# Patient Record
Sex: FEMALE | Race: WHITE | Employment: OTHER | URBAN - METROPOLITAN AREA
[De-identification: names, ages, dates, MRNs, and addresses within clinical notes are randomized per-mention and may not be internally consistent; named-entity substitution may affect disease eponyms.]

---

## 2024-07-23 ENCOUNTER — TELEPHONE (OUTPATIENT)
Dept: FAMILY MEDICINE CLINIC | Facility: CLINIC | Age: 74
End: 2024-07-23

## 2024-07-24 ENCOUNTER — LAB ENCOUNTER (OUTPATIENT)
Dept: LAB | Facility: HOSPITAL | Age: 74
End: 2024-07-24
Attending: FAMILY MEDICINE
Payer: MEDICARE

## 2024-07-24 ENCOUNTER — HOSPITAL ENCOUNTER (OUTPATIENT)
Dept: GENERAL RADIOLOGY | Facility: HOSPITAL | Age: 74
Discharge: HOME | End: 2024-07-24
Attending: FAMILY MEDICINE
Payer: MEDICARE

## 2024-07-24 ENCOUNTER — OFFICE VISIT (OUTPATIENT)
Dept: FAMILY MEDICINE CLINIC | Facility: CLINIC | Age: 74
End: 2024-07-24
Payer: MEDICARE

## 2024-07-24 VITALS
HEIGHT: 61 IN | WEIGHT: 150 LBS | RESPIRATION RATE: 16 BRPM | DIASTOLIC BLOOD PRESSURE: 82 MMHG | OXYGEN SATURATION: 96 % | HEART RATE: 82 BPM | TEMPERATURE: 98 F | SYSTOLIC BLOOD PRESSURE: 130 MMHG | BODY MASS INDEX: 28.32 KG/M2

## 2024-07-24 DIAGNOSIS — Z79.01 CHRONIC ANTICOAGULATION: ICD-10-CM

## 2024-07-24 DIAGNOSIS — I10 ESSENTIAL HYPERTENSION: ICD-10-CM

## 2024-07-24 DIAGNOSIS — M80.00XS AGE-RELATED OSTEOPOROSIS WITH CURRENT PATHOLOGICAL FRACTURE, SEQUELA: ICD-10-CM

## 2024-07-24 DIAGNOSIS — M48.061 SPINAL STENOSIS OF LUMBAR REGION, UNSPECIFIED WHETHER NEUROGENIC CLAUDICATION PRESENT: ICD-10-CM

## 2024-07-24 DIAGNOSIS — E78.00 HIGH CHOLESTEROL: ICD-10-CM

## 2024-07-24 DIAGNOSIS — Z86.711 HX OF PULMONARY EMBOLUS: ICD-10-CM

## 2024-07-24 DIAGNOSIS — J45.40 MODERATE PERSISTENT ASTHMA, UNSPECIFIED WHETHER COMPLICATED (HCC): ICD-10-CM

## 2024-07-24 DIAGNOSIS — Z01.812 PRE-OPERATIVE LABORATORY EXAMINATION: ICD-10-CM

## 2024-07-24 DIAGNOSIS — Z01.818 PREOP EXAMINATION: ICD-10-CM

## 2024-07-24 DIAGNOSIS — N18.31 CKD STAGE 3A, GFR 45-59 ML/MIN (HCC): ICD-10-CM

## 2024-07-24 DIAGNOSIS — Z01.818 PREOP EXAMINATION: Primary | ICD-10-CM

## 2024-07-24 DIAGNOSIS — E11.65 TYPE 2 DIABETES MELLITUS WITH HYPERGLYCEMIA, WITHOUT LONG-TERM CURRENT USE OF INSULIN (HCC): ICD-10-CM

## 2024-07-24 PROBLEM — M80.00XA AGE-RELATED OSTEOPOROSIS WITH CURRENT PATHOLOGICAL FRACTURE: Status: ACTIVE | Noted: 2024-07-24

## 2024-07-24 PROBLEM — J45.909 ASTHMA (HCC): Status: ACTIVE | Noted: 2024-07-24

## 2024-07-24 LAB
ALBUMIN SERPL-MCNC: 4.9 G/DL (ref 3.2–4.8)
ALBUMIN/GLOB SERPL: 2.2 {RATIO} (ref 1–2)
ALP LIVER SERPL-CCNC: 72 U/L
ALT SERPL-CCNC: 15 U/L
ANION GAP SERPL CALC-SCNC: 8 MMOL/L (ref 0–18)
APTT PPP: 41.3 SECONDS (ref 23–36)
AST SERPL-CCNC: 32 U/L (ref ?–34)
ATRIAL RATE: 80 BPM
BASOPHILS # BLD AUTO: 0.07 X10(3) UL (ref 0–0.2)
BASOPHILS NFR BLD AUTO: 0.8 %
BILIRUB SERPL-MCNC: 0.6 MG/DL (ref 0.2–1.1)
BILIRUB UR QL: NEGATIVE
BUN BLD-MCNC: 15 MG/DL (ref 9–23)
BUN/CREAT SERPL: 19.2 (ref 10–20)
CALCIUM BLD-MCNC: 9.8 MG/DL (ref 8.7–10.4)
CHLORIDE SERPL-SCNC: 107 MMOL/L (ref 98–112)
CO2 SERPL-SCNC: 25 MMOL/L (ref 21–32)
CREAT BLD-MCNC: 0.78 MG/DL
DEPRECATED RDW RBC AUTO: 46.9 FL (ref 35.1–46.3)
EGFRCR SERPLBLD CKD-EPI 2021: 81 ML/MIN/1.73M2 (ref 60–?)
EOSINOPHIL # BLD AUTO: 0.2 X10(3) UL (ref 0–0.7)
EOSINOPHIL NFR BLD AUTO: 2.2 %
ERYTHROCYTE [DISTWIDTH] IN BLOOD BY AUTOMATED COUNT: 13.7 % (ref 11–15)
FASTING STATUS PATIENT QL REPORTED: NO
GLOBULIN PLAS-MCNC: 2.2 G/DL (ref 2–3.5)
GLUCOSE BLD-MCNC: 99 MG/DL (ref 70–99)
GLUCOSE UR-MCNC: NORMAL MG/DL
HCT VFR BLD AUTO: 40.8 %
HGB BLD-MCNC: 13.1 G/DL
HGB UR QL STRIP.AUTO: NEGATIVE
IMM GRANULOCYTES # BLD AUTO: 0.03 X10(3) UL (ref 0–1)
IMM GRANULOCYTES NFR BLD: 0.3 %
INR BLD: 0.99 (ref 0.8–1.2)
KETONES UR-MCNC: NEGATIVE MG/DL
LEUKOCYTE ESTERASE UR QL STRIP.AUTO: 500
LYMPHOCYTES # BLD AUTO: 2.35 X10(3) UL (ref 1–4)
LYMPHOCYTES NFR BLD AUTO: 26.3 %
MCH RBC QN AUTO: 29.6 PG (ref 26–34)
MCHC RBC AUTO-ENTMCNC: 32.1 G/DL (ref 31–37)
MCV RBC AUTO: 92.1 FL
MONOCYTES # BLD AUTO: 0.53 X10(3) UL (ref 0.1–1)
MONOCYTES NFR BLD AUTO: 5.9 %
NEUTROPHILS # BLD AUTO: 5.77 X10 (3) UL (ref 1.5–7.7)
NEUTROPHILS # BLD AUTO: 5.77 X10(3) UL (ref 1.5–7.7)
NEUTROPHILS NFR BLD AUTO: 64.5 %
OSMOLALITY SERPL CALC.SUM OF ELEC: 291 MOSM/KG (ref 275–295)
P AXIS: 47 DEGREES
P-R INTERVAL: 188 MS
PH UR: 5.5 [PH] (ref 5–8)
PLATELET # BLD AUTO: 349 10(3)UL (ref 150–450)
POTASSIUM SERPL-SCNC: 4.4 MMOL/L (ref 3.5–5.1)
PROT SERPL-MCNC: 7.1 G/DL (ref 5.7–8.2)
PROT UR-MCNC: NEGATIVE MG/DL
PROTHROMBIN TIME: 13.7 SECONDS (ref 11.6–14.8)
Q-T INTERVAL: 376 MS
QRS DURATION: 100 MS
QTC CALCULATION (BEZET): 433 MS
R AXIS: -54 DEGREES
RBC # BLD AUTO: 4.43 X10(6)UL
RBC #/AREA URNS AUTO: >10 /HPF
SODIUM SERPL-SCNC: 140 MMOL/L (ref 136–145)
SP GR UR STRIP: 1.01 (ref 1–1.03)
T AXIS: 71 DEGREES
UROBILINOGEN UR STRIP-ACNC: NORMAL
VENTRICULAR RATE: 80 BPM
WBC # BLD AUTO: 9 X10(3) UL (ref 4–11)
WBC #/AREA URNS AUTO: >50 /HPF

## 2024-07-24 PROCEDURE — 87077 CULTURE AEROBIC IDENTIFY: CPT

## 2024-07-24 PROCEDURE — 87086 URINE CULTURE/COLONY COUNT: CPT

## 2024-07-24 PROCEDURE — 87186 SC STD MICRODIL/AGAR DIL: CPT

## 2024-07-24 PROCEDURE — 85730 THROMBOPLASTIN TIME PARTIAL: CPT

## 2024-07-24 PROCEDURE — 87147 CULTURE TYPE IMMUNOLOGIC: CPT

## 2024-07-24 PROCEDURE — 85025 COMPLETE CBC W/AUTO DIFF WBC: CPT

## 2024-07-24 PROCEDURE — 80053 COMPREHEN METABOLIC PANEL: CPT

## 2024-07-24 PROCEDURE — 99204 OFFICE O/P NEW MOD 45 MIN: CPT | Performed by: FAMILY MEDICINE

## 2024-07-24 PROCEDURE — 93005 ELECTROCARDIOGRAM TRACING: CPT

## 2024-07-24 PROCEDURE — 87081 CULTURE SCREEN ONLY: CPT

## 2024-07-24 PROCEDURE — 93010 ELECTROCARDIOGRAM REPORT: CPT | Performed by: INTERNAL MEDICINE

## 2024-07-24 PROCEDURE — 36415 COLL VENOUS BLD VENIPUNCTURE: CPT

## 2024-07-24 PROCEDURE — 81001 URINALYSIS AUTO W/SCOPE: CPT

## 2024-07-24 PROCEDURE — 85610 PROTHROMBIN TIME: CPT

## 2024-07-24 PROCEDURE — 71046 X-RAY EXAM CHEST 2 VIEWS: CPT | Performed by: FAMILY MEDICINE

## 2024-07-24 RX ORDER — LEVOTHYROXINE SODIUM 0.05 MG/1
50 TABLET ORAL
COMMUNITY

## 2024-07-24 RX ORDER — OXYCODONE HYDROCHLORIDE AND ACETAMINOPHEN 5; 325 MG/1; MG/1
1 TABLET ORAL 2 TIMES DAILY
COMMUNITY

## 2024-07-24 RX ORDER — FLUTICASONE FUROATE, UMECLIDINIUM BROMIDE AND VILANTEROL TRIFENATATE 200; 62.5; 25 UG/1; UG/1; UG/1
1 POWDER RESPIRATORY (INHALATION) DAILY
COMMUNITY

## 2024-07-24 RX ORDER — LISINOPRIL 5 MG/1
5 TABLET ORAL DAILY
COMMUNITY

## 2024-07-24 RX ORDER — DULOXETIN HYDROCHLORIDE 30 MG/1
30 CAPSULE, DELAYED RELEASE ORAL DAILY
COMMUNITY

## 2024-07-24 RX ORDER — SIMVASTATIN 10 MG
10 TABLET ORAL DAILY
COMMUNITY

## 2024-07-24 RX ORDER — CHOLECALCIFEROL (VITAMIN D3) 50 MCG
2000 TABLET ORAL DAILY
COMMUNITY

## 2024-07-24 RX ORDER — GABAPENTIN 400 MG/1
400 CAPSULE ORAL 3 TIMES DAILY
COMMUNITY

## 2024-07-24 RX ORDER — FERROUS SULFATE 325(65) MG
325 TABLET, DELAYED RELEASE (ENTERIC COATED) ORAL
COMMUNITY

## 2024-07-24 RX ORDER — MORPHINE SULFATE 15 MG/1
15 TABLET ORAL 2 TIMES DAILY
COMMUNITY

## 2024-07-24 RX ORDER — MONTELUKAST SODIUM 10 MG/1
10 TABLET ORAL 2 TIMES DAILY
COMMUNITY

## 2024-07-24 RX ORDER — FENOFIBRATE 134 MG/1
134 CAPSULE ORAL DAILY
COMMUNITY

## 2024-07-24 NOTE — TELEPHONE ENCOUNTER
L3-5 Laminectomy on 08/22/24 with Dr. Krishna @ [facility]    H&P- completed 07/24/24  Labs- RDW-SD (46.9), albumin (4.9), A/G ratio (2.2), PTT (41.3), +MRSA, Urine Cx shows >100,000 CFU/ML Klebsiella Pneumoniae, all other labs WNL    EKG- Normal sinus rhythm   Left anterior fascicular block   Minimal voltage criteria for LVH, may be normal variant ( David product )   Nonspecific T wave abnormality   Abnormal ECG   When compared with ECG of 29-DEC-2004 22:19,   Left anterior fascicular block is now Present   Non-specific change in ST segment in Inferior leads   T wave inversion no longer evident in Inferior leads   T wave inversion less evident in Anterior-lateral leads     X-ray- WNL    Cardiology- Dr. Ugo Trujillo @ [facility] [address] [facility]  P- [phone]  IVC filter to be placed 07/30/24 [facility] @ 8:30am  Cardio Clx sent to scanning 07/25/24:  \"Pre-Operative Cardiac Risk Level: Low Cardiac Risk. Cleared to hold Eliquis x3 days. ECG unchanged from previous tolerated cystoscopy during sepsis without cardiac event.\"    Hematology- Dr. Estrada @ [facility]  P- [phone]  F-  Eliquis recs needed  LMTCB to get clearance and recs- Dr. Krishna office may have sent already. 07/25/24

## 2024-07-25 RX ORDER — MUPIROCIN 20 MG/G
OINTMENT TOPICAL
Qty: 22 G | Refills: 0 | Status: SHIPPED | OUTPATIENT
Start: 2024-07-25 | End: 2024-08-23

## 2024-07-25 NOTE — TELEPHONE ENCOUNTER
Rx for Bactroban ointment sent to pharmacy for +MRSA. Patient will use now for 5 days BID prior to IVC filter placement on 07/30/24 and then again 5 days prior to surgery on 08/17-08/21. Patient given instructions.

## 2024-07-25 NOTE — H&P
HPI:                                                                                                                                           PRE-OP NOTE  HISTORY AND PHYSICAL                                                                                                                                                                                                                                         I have been consulted by Dr. Krishna to see Petty Valdovinos 73 year old female for a preoperative evaluation and medical clearance. Petty has a long history of worsening severe degenerative lumbar spinal stenosis. Patient is to have L3-5 laminectomy by Dr. Krishna on 8/22/24 at [facility].     Pt suffers significant pain and loss of function.     No cardiopulmonary symptoms.     No history of ENMA or DVT. Denies tobacco use.      Pt suffered from a lumbar fracture that was complicated by severe pulmonary embolism (saddle) in December 2023. She has been on chronic eliquis since that time. She is to have an IVC filter placed before surgery. Her hematologist is to recommend how to regulate her anticoagulation in the perioperative time period.       Family History   Problem Relation Age of Onset    Other (Other) Father         MI    Cancer Mother         breast      Current Outpatient Medications   Medication Sig Dispense Refill    Multiple Vitamins-Minerals (ICAPS AREDS 2 OR) Take 2 tablets by mouth daily.      Calcium Carbonate-Vit D-Min (CALCIUM 1200 OR) Take 1 tablet by mouth daily.      DULoxetine 30 MG Oral Cap DR Particles Take 1 capsule (30 mg total) by mouth daily.      apixaban 5 MG Oral Tab Take 1 tablet (5 mg total) by mouth 2 (two) times daily.      fenofibrate micronized 134 MG Oral Cap Take 1 capsule (134 mg total) by mouth daily.      gabapentin 400 MG Oral Cap Take 1 capsule (400 mg total) by mouth 3 (three) times daily.      ferrous sulfate 325 (65 FE) MG Oral Tab EC Take 1 tablet (325 mg total) by mouth 3  (three) times a week. Monday, Wednesday, Friday      levothyroxine 50 MCG Oral Tab Take 1 tablet (50 mcg total) by mouth before breakfast.      lisinopril 5 MG Oral Tab Take 1 tablet (5 mg total) by mouth daily.      metFORMIN 500 MG Oral Tab Take 1 tablet (500 mg total) by mouth 2 (two) times daily with meals.      montelukast 10 MG Oral Tab Take 1 tablet (10 mg total) by mouth 2 (two) times daily.      morphINE 15 MG Oral Tab Take 1 tablet (15 mg total) by mouth in the morning and 1 tablet (15 mg total) before bedtime.      Multiple Vitamin (MULTIVITAMIN ADULT OR) Take 1 tablet by mouth daily.      oxyCODONE-acetaminophen 5-325 MG Oral Tab Take 1 tablet by mouth in the morning and 1 tablet before bedtime.      simvastatin 10 MG Oral Tab Take 1 tablet (10 mg total) by mouth daily.      fluticasone-umeclidin-vilant (TRELEGY ELLIPTA) 200-62.5-25 MCG/ACT Inhalation Aerosol Powder, Breath Activated Inhale 1 puff into the lungs daily.      cholecalciferol 50 MCG (2000 UT) Oral Tab Take 1 tablet (2,000 Units total) by mouth daily.       Past Medical History:    Anemia    Anxiety and depression    Asthma (HCC)    Cataract    Colostomy present (HCC)    Diabetes (HCC)    Type 2    History of blood transfusion    after colectomy    HTN (hypertension)    Hyperlipidemia    Hypothyroidism    Kidney stones    Macular degeneration    Osteoporosis    Pneumonia    Pulmonary embolism (HCC)    saddle- possibly due to immobility/fall    Pyelonephritis    Spinal stenosis    lumbar    Stage 3a chronic kidney disease (CKD) (HCC)     Past Surgical History:   Procedure Laterality Date    Colon surgery      costomy bag right side of abdomen    Lumbar spine surgery      kyphoplasty    Other surgical history      ureteric stent- due to kidney stones    Other surgical history  12/2023    saddle pulmonary embolism removed    Parathyroidectomy       Social History     Socioeconomic History    Marital status:      Spouse name: Not on file     Number of children: Not on file    Years of education: Not on file    Highest education level: Not on file   Occupational History    Not on file   Tobacco Use    Smoking status: Never     Passive exposure: Never    Smokeless tobacco: Never   Vaping Use    Vaping status: Never Used   Substance and Sexual Activity    Alcohol use: Never    Drug use: Never    Sexual activity: Not on file   Other Topics Concern    Not on file   Social History Narrative    Not on file     Social Determinants of Health     Financial Resource Strain: Not on file   Food Insecurity: Not on file   Transportation Needs: Not on file   Physical Activity: Not on file   Stress: Not on file   Social Connections: Not on file   Housing Stability: Not on file       REVIEW OF SYSTEMS:   CONSTITUTIONAL:  Denies unusual weight gain/loss, fever, chills  EENT:  Eyes:  Denies eye pain, visual loss, blurred vision, double vision. Ears, Nose, Throat:  Denies congestion, runny nose or sore throat.  INTEGUMENTARY:  Denies rashes, itching, skin lesion,   CARDIOVASCULAR:  DVT and PE in 12/2023. Denies chest pain, palpitations, edema, dyspnea  RESPIRATORY: no ENMA ,Denies shortness of breath, wheezing, cough  GASTROINTESTINAL:  Denies abdominal pain, nausea, vomiting, constipation, diarrhea, or blood in stool.  MUSCULOSKELETAL:  severe pain as noted above  NEUROLOGICAL:  Denies headache, seizures, dizziness, syncope, paralysis, ataxia,  HEMATOLOGIC:  Denies anemia, bleeding or bruising.  LYMPHATICS:  Denies enlarged nodes   PSYCHIATRIC:  Denies depression or anxiety.  ENDOCRINOLOGIC: DM 2 yes  ALLERGIES:  Denies allergic response, history of asthma, hives,     EXAM:   /82 (BP Location: Left arm)   Pulse 82   Temp 97.8 °F (36.6 °C) (Temporal)   Resp 16   Ht 5' 1\" (1.549 m)   Wt 150 lb (68 kg)   SpO2 96%   BMI 28.34 kg/m²  Estimated body mass index is 28.34 kg/m² as calculated from the following:    Height as of this encounter: 5' 1\" (1.549 m).     Weight as of this encounter: 150 lb (68 kg).   Vital signs reviewed.Appears stated age, well groomed.  Physical Exam:  GEN:  Patient is alert, awake and oriented, well developed, well nourished, no apparent distress.  HEENT:  Head:  Normocephalic, atraumatic Eyes: EOMI, no scleral icterus, conjunctivae clear bilaterally, no eye discharge Nose: patent, no nasal discharge   NECK: Supple, no CLAD, no carotid bruit, no thyromegaly.  SKIN: No rashes, no skin lesion, no bruising, good turgor.  HEART:  Regular rate and rhythm, no murmurs, rubs or gallops.  LUNGS: Clear to auscultation bilterally, no rales/rhonchi/wheezing.  CHEST: No tenderness.  ABDOMEN:  Soft, nondistended, nontender,  no masses, no hepatosplenomegaly.  BACK: No tenderness, no spasm,   EXTREMITIES:  No edema, no cyanosis, no clubbing,   NEURO:  No focal deficit, speech fluent, normal gait, strength and tone, sensory intact      Lab Results   Component Value Date    WBC 9.0 07/24/2024    HGB 13.1 07/24/2024    HCT 40.8 07/24/2024    .0 07/24/2024    CREATSERUM 0.78 07/24/2024    BUN 15 07/24/2024     07/24/2024    K 4.4 07/24/2024     07/24/2024    CO2 25.0 07/24/2024    GLU 99 07/24/2024    CA 9.8 07/24/2024    ALB 4.9 (H) 07/24/2024    ALKPHO 72 07/24/2024    BILT 0.6 07/24/2024    TP 7.1 07/24/2024    AST 32 07/24/2024    ALT 15 07/24/2024    PTT 41.3 (H) 07/24/2024    INR 0.99 07/24/2024       XR CHEST PA + LAT CHEST (CPT=71046)    Result Date: 7/24/2024  CONCLUSION:   No focal opacity, pleural effusion, or pneumothorax.   Borderline enlarged cardiomediastinal silhouette.    Dictated by (CST): Gómez Ng MD on 7/24/2024 at 2:13 PM     Finalized by (CST): Gómez Ng MD on 7/24/2024 at 2:14 PM           EKG 12 Lead    Result Date: 7/24/2024  Normal sinus rhythm Left anterior fascicular block Minimal voltage criteria for LVH, may be normal variant ( [place] product ) Nonspecific T wave abnormality Abnormal ECG When compared  with ECG of 29-DEC-2004 22:19, Left anterior fascicular block is now Present Non-specific change in ST segment in Inferior leads T wave inversion no longer evident in Inferior leads T wave inversion less evident in Anterior-lateral leads Confirmed by Alo Aguilar ([id number]) on 7/24/2024 1:58:49 PM     ASSESSMENT AND PLAN:   Petty Valdovinos is a 73 year old female, with a hx of severe degenerative lumbar spinal stenosis. Patient is to have L3-5 laminectomy by Dr. Krishna on 8/22/24 at [facility].      Patient Active Problem List   Diagnosis    Hx of pulmonary embolus    Lumbar spinal stenosis    CKD stage 3a, GFR 45-59 ml/min ([facility])    Age-related osteoporosis with current pathological fracture    Type 2 diabetes mellitus with hyperglycemia (HCC)    Essential hypertension    High cholesterol    Asthma ([facility])    Chronic anticoagulation        ECG and labs have been reviewed and are in acceptable range for surgery.     Preoperative Risk Stratification: There are no decompensated medical conditions. ASA classification 2 - 3      Patient has and low to moderate risk for major cardiac event in the perioperative period.      PLAN:    Patient has an increased but acceptable risk of surgery and may proceed with surgery as planned. She is to have an IVC filter placed under direction of her hematologist. She is to stop eliquis 36 - 48 hours before surgery and resume postoperatively when ok with Dr. Krishna.       Postoperative Recommendations:    Anticoagulation / DVT prophylaxis: SCDs, as per Dr. Krishna. Early ambulation. Pt to resume eliquis postoperatively when cleared ok'd by Dr. Krishna  GI protection: Protonix  Incentive Spirometry   Telemetry as needed      Pain management and Physical therapy as per Orthopedic service.   Home Health as needed    Thank you for the opportunity to care for your patient and to assist in managing the postoperative course.        Daniele Panda MD  7/24/2024  8:06 PM

## 2024-07-26 RX ORDER — CEFADROXIL 500 MG/1
500 CAPSULE ORAL 2 TIMES DAILY
Qty: 20 CAPSULE | Refills: 0 | Status: SHIPPED | OUTPATIENT
Start: 2024-07-26 | End: 2024-08-09 | Stop reason: ALTCHOICE

## 2024-07-26 NOTE — TELEPHONE ENCOUNTER
Daniele Panda MD Phyllis needs mupirocin for her MRSA nasal results.  If the urine culture is sensitive to cephalosporins I would start her on cephadroxil 500 mg bid for 10 days.

## 2024-07-26 NOTE — TELEPHONE ENCOUNTER
Dr. Panda, please review:    Labs- RDW-SD (46.9), albumin (4.9), A/G ratio (2.2), PTT (41.3), +MRSA, Urine Cx shows >100,000 CFU/ML Klebsiella Pneumoniae, all other labs WNL    EKG- Normal sinus rhythm   Left anterior fascicular block   Minimal voltage criteria for LVH, may be normal variant ( David product )   Nonspecific T wave abnormality   Abnormal ECG   When compared with ECG of 29-DEC-2004 22:19,   Left anterior fascicular block is now Present   Non-specific change in ST segment in Inferior leads   T wave inversion no longer evident in Inferior leads   T wave inversion less evident in Anterior-lateral leads     X-ray- WNL    Cardiology- Dr. Ugo Trujillo @ [facility]  P- [phone]  IVC filter to be placed 07/30/24 [facility] @ 8:30am  Cardio Clx sent to scanning 07/25/24:  \"Pre-Operative Cardiac Risk Level: Low Cardiac Risk. Cleared to hold Eliquis x3 days. ECG unchanged from previous tolerated cystoscopy during sepsis without cardiac event.\"    Urine Culture:    Susceptibility     Klebsiella pneumoniae     Not Specified    Ampicillin >=32 Resistant    Ampicillin + Sulbactam 4 Sensitive    Cefazolin <=4 Sensitive    Ciprofloxacin <=0.25 Sensitive    Gentamicin <=1 Sensitive    Levofloxacin <=0.12 Sensitive    Meropenem <=0.25 Sensitive    Nitrofurantoin 64 Intermediate    Piperacillin + Tazobactam <=4 Sensitive    Trimethoprim/Sulfa <=20 Sensitive              OK to give cephalexin 500mg BID x10 days still.     OK for surgery?

## 2024-07-26 NOTE — TELEPHONE ENCOUNTER
Spoke to patient, went over antibiotic for UTI. Rx sent to pharmacy. Patient will hold Eliquis for 3 days prior to surgery. She is clear for surgery per Dr. Panda.

## 2024-07-26 NOTE — TELEPHONE ENCOUNTER
Cephadroxil 500 mg bid for 10 days for uti.     Cardiology note appreciated. Pt is medically clear to proceed with surgery as planned.

## 2024-08-22 ENCOUNTER — ANESTHESIA (OUTPATIENT)
Dept: SURGERY | Facility: HOSPITAL | Age: 74
End: 2024-08-22
Payer: MEDICARE

## 2024-08-22 ENCOUNTER — HOSPITAL ENCOUNTER (INPATIENT)
Facility: HOSPITAL | Age: 74
LOS: 1 days | Discharge: HOME | End: 2024-08-23
Attending: ORTHOPAEDIC SURGERY | Admitting: ORTHOPAEDIC SURGERY
Payer: MEDICARE

## 2024-08-22 ENCOUNTER — ANESTHESIA EVENT (OUTPATIENT)
Dept: SURGERY | Facility: HOSPITAL | Age: 74
End: 2024-08-22
Payer: MEDICARE

## 2024-08-22 ENCOUNTER — APPOINTMENT (OUTPATIENT)
Dept: GENERAL RADIOLOGY | Facility: HOSPITAL | Age: 74
End: 2024-08-22
Attending: ORTHOPAEDIC SURGERY
Payer: MEDICARE

## 2024-08-22 PROBLEM — Z01.818 PRE-OP TESTING: Status: ACTIVE | Noted: 2024-08-22

## 2024-08-22 PROBLEM — Z01.818 PRE-OP TESTING: Status: RESOLVED | Noted: 2024-08-22 | Resolved: 2024-08-22

## 2024-08-22 PROBLEM — Z98.1 S/P LUMBAR SPINAL FUSION: Status: ACTIVE | Noted: 2024-08-22

## 2024-08-22 LAB
GLUCOSE BLDC GLUCOMTR-MCNC: 102 MG/DL (ref 70–99)
GLUCOSE BLDC GLUCOMTR-MCNC: 111 MG/DL (ref 70–99)
GLUCOSE BLDC GLUCOMTR-MCNC: 126 MG/DL (ref 70–99)
GLUCOSE BLDC GLUCOMTR-MCNC: 155 MG/DL (ref 70–99)

## 2024-08-22 PROCEDURE — 94640 AIRWAY INHALATION TREATMENT: CPT

## 2024-08-22 PROCEDURE — 82962 GLUCOSE BLOOD TEST: CPT

## 2024-08-22 PROCEDURE — 00NY0ZZ RELEASE LUMBAR SPINAL CORD, OPEN APPROACH: ICD-10-PCS | Performed by: ORTHOPAEDIC SURGERY

## 2024-08-22 PROCEDURE — 0SG1071 FUSION OF 2 OR MORE LUMBAR VERTEBRAL JOINTS WITH AUTOLOGOUS TISSUE SUBSTITUTE, POSTERIOR APPROACH, POSTERIOR COLUMN, OPEN APPROACH: ICD-10-PCS | Performed by: ORTHOPAEDIC SURGERY

## 2024-08-22 PROCEDURE — 76000 FLUOROSCOPY <1 HR PHYS/QHP: CPT | Performed by: ORTHOPAEDIC SURGERY

## 2024-08-22 RX ORDER — ZOLPIDEM TARTRATE 5 MG/1
5 TABLET ORAL NIGHTLY PRN
Status: DISCONTINUED | OUTPATIENT
Start: 2024-08-22 | End: 2024-08-23

## 2024-08-22 RX ORDER — SODIUM CHLORIDE, SODIUM LACTATE, POTASSIUM CHLORIDE, CALCIUM CHLORIDE 600; 310; 30; 20 MG/100ML; MG/100ML; MG/100ML; MG/100ML
INJECTION, SOLUTION INTRAVENOUS CONTINUOUS
Status: DISCONTINUED | OUTPATIENT
Start: 2024-08-22 | End: 2024-08-22 | Stop reason: HOSPADM

## 2024-08-22 RX ORDER — EPHEDRINE SULFATE 50 MG/ML
INJECTION, SOLUTION INTRAVENOUS AS NEEDED
Status: DISCONTINUED | OUTPATIENT
Start: 2024-08-22 | End: 2024-08-22 | Stop reason: SURG

## 2024-08-22 RX ORDER — DIPHENHYDRAMINE HYDROCHLORIDE 50 MG/ML
25 INJECTION INTRAMUSCULAR; INTRAVENOUS EVERY 4 HOURS PRN
Status: DISCONTINUED | OUTPATIENT
Start: 2024-08-22 | End: 2024-08-23

## 2024-08-22 RX ORDER — LIDOCAINE HYDROCHLORIDE 40 MG/ML
SOLUTION TOPICAL AS NEEDED
Status: DISCONTINUED | OUTPATIENT
Start: 2024-08-22 | End: 2024-08-22 | Stop reason: SURG

## 2024-08-22 RX ORDER — SENNOSIDES 8.6 MG
17.2 TABLET ORAL NIGHTLY
Status: DISCONTINUED | OUTPATIENT
Start: 2024-08-22 | End: 2024-08-23

## 2024-08-22 RX ORDER — NICOTINE POLACRILEX 4 MG
30 LOZENGE BUCCAL
Status: DISCONTINUED | OUTPATIENT
Start: 2024-08-22 | End: 2024-08-23

## 2024-08-22 RX ORDER — FLUTICASONE PROPIONATE AND SALMETEROL 500; 50 UG/1; UG/1
1 POWDER RESPIRATORY (INHALATION) 2 TIMES DAILY
Status: DISCONTINUED | OUTPATIENT
Start: 2024-08-22 | End: 2024-08-23

## 2024-08-22 RX ORDER — METOCLOPRAMIDE HYDROCHLORIDE 5 MG/ML
10 INJECTION INTRAMUSCULAR; INTRAVENOUS EVERY 8 HOURS PRN
Status: DISCONTINUED | OUTPATIENT
Start: 2024-08-22 | End: 2024-08-23

## 2024-08-22 RX ORDER — DEXTROSE MONOHYDRATE 25 G/50ML
50 INJECTION, SOLUTION INTRAVENOUS
Status: DISCONTINUED | OUTPATIENT
Start: 2024-08-22 | End: 2024-08-22 | Stop reason: HOSPADM

## 2024-08-22 RX ORDER — FENOFIBRATE 134 MG/1
134 CAPSULE ORAL DAILY
Status: DISCONTINUED | OUTPATIENT
Start: 2024-08-22 | End: 2024-08-23

## 2024-08-22 RX ORDER — DULOXETIN HYDROCHLORIDE 30 MG/1
30 CAPSULE, DELAYED RELEASE ORAL DAILY
Status: DISCONTINUED | OUTPATIENT
Start: 2024-08-23 | End: 2024-08-23

## 2024-08-22 RX ORDER — OXYCODONE HYDROCHLORIDE 5 MG/1
10 TABLET ORAL EVERY 4 HOURS PRN
Status: DISCONTINUED | OUTPATIENT
Start: 2024-08-22 | End: 2024-08-23

## 2024-08-22 RX ORDER — SODIUM CHLORIDE, SODIUM LACTATE, POTASSIUM CHLORIDE, CALCIUM CHLORIDE 600; 310; 30; 20 MG/100ML; MG/100ML; MG/100ML; MG/100ML
INJECTION, SOLUTION INTRAVENOUS CONTINUOUS
Status: DISCONTINUED | OUTPATIENT
Start: 2024-08-22 | End: 2024-08-23

## 2024-08-22 RX ORDER — DIPHENHYDRAMINE HCL 25 MG
25 CAPSULE ORAL EVERY 4 HOURS PRN
Status: DISCONTINUED | OUTPATIENT
Start: 2024-08-22 | End: 2024-08-23

## 2024-08-22 RX ORDER — DIAZEPAM 10 MG/2ML
5 INJECTION, SOLUTION INTRAMUSCULAR; INTRAVENOUS ONCE
Status: COMPLETED | OUTPATIENT
Start: 2024-08-22 | End: 2024-08-22

## 2024-08-22 RX ORDER — MORPHINE SULFATE 4 MG/ML
2 INJECTION, SOLUTION INTRAMUSCULAR; INTRAVENOUS EVERY 10 MIN PRN
Status: DISCONTINUED | OUTPATIENT
Start: 2024-08-22 | End: 2024-08-22 | Stop reason: HOSPADM

## 2024-08-22 RX ORDER — NICOTINE POLACRILEX 4 MG
15 LOZENGE BUCCAL
Status: DISCONTINUED | OUTPATIENT
Start: 2024-08-22 | End: 2024-08-22 | Stop reason: HOSPADM

## 2024-08-22 RX ORDER — MORPHINE SULFATE 15 MG/1
15 TABLET ORAL EVERY 4 HOURS PRN
Status: DISCONTINUED | OUTPATIENT
Start: 2024-08-22 | End: 2024-08-23

## 2024-08-22 RX ORDER — DEXAMETHASONE SODIUM PHOSPHATE 4 MG/ML
VIAL (ML) INJECTION AS NEEDED
Status: DISCONTINUED | OUTPATIENT
Start: 2024-08-22 | End: 2024-08-22 | Stop reason: SURG

## 2024-08-22 RX ORDER — DOCUSATE SODIUM 100 MG/1
100 CAPSULE, LIQUID FILLED ORAL 2 TIMES DAILY
Status: DISCONTINUED | OUTPATIENT
Start: 2024-08-22 | End: 2024-08-23

## 2024-08-22 RX ORDER — POLYETHYLENE GLYCOL 3350 17 G/17G
17 POWDER, FOR SOLUTION ORAL DAILY PRN
Status: DISCONTINUED | OUTPATIENT
Start: 2024-08-22 | End: 2024-08-23

## 2024-08-22 RX ORDER — MONTELUKAST SODIUM 10 MG/1
10 TABLET ORAL DAILY
Status: DISCONTINUED | OUTPATIENT
Start: 2024-08-22 | End: 2024-08-23

## 2024-08-22 RX ORDER — MORPHINE SULFATE 15 MG/1
15 TABLET, FILM COATED, EXTENDED RELEASE ORAL EVERY 12 HOURS SCHEDULED
Status: DISCONTINUED | OUTPATIENT
Start: 2024-08-22 | End: 2024-08-23

## 2024-08-22 RX ORDER — MORPHINE SULFATE 4 MG/ML
4 INJECTION, SOLUTION INTRAMUSCULAR; INTRAVENOUS EVERY 10 MIN PRN
Status: DISCONTINUED | OUTPATIENT
Start: 2024-08-22 | End: 2024-08-22 | Stop reason: HOSPADM

## 2024-08-22 RX ORDER — ROCURONIUM BROMIDE 10 MG/ML
INJECTION, SOLUTION INTRAVENOUS AS NEEDED
Status: DISCONTINUED | OUTPATIENT
Start: 2024-08-22 | End: 2024-08-22 | Stop reason: SURG

## 2024-08-22 RX ORDER — VANCOMYCIN HYDROCHLORIDE 1 G/20ML
INJECTION, POWDER, LYOPHILIZED, FOR SOLUTION INTRAVENOUS AS NEEDED
Status: DISCONTINUED | OUTPATIENT
Start: 2024-08-22 | End: 2024-08-22 | Stop reason: HOSPADM

## 2024-08-22 RX ORDER — OXYCODONE HYDROCHLORIDE 5 MG/1
5 TABLET ORAL EVERY 4 HOURS PRN
Status: DISCONTINUED | OUTPATIENT
Start: 2024-08-22 | End: 2024-08-23

## 2024-08-22 RX ORDER — NICOTINE POLACRILEX 4 MG
30 LOZENGE BUCCAL
Status: DISCONTINUED | OUTPATIENT
Start: 2024-08-22 | End: 2024-08-22 | Stop reason: HOSPADM

## 2024-08-22 RX ORDER — NICOTINE POLACRILEX 4 MG
15 LOZENGE BUCCAL
Status: DISCONTINUED | OUTPATIENT
Start: 2024-08-22 | End: 2024-08-23

## 2024-08-22 RX ORDER — PRAVASTATIN SODIUM 20 MG
20 TABLET ORAL NIGHTLY
Status: DISCONTINUED | OUTPATIENT
Start: 2024-08-22 | End: 2024-08-23

## 2024-08-22 RX ORDER — NALOXONE HYDROCHLORIDE 0.4 MG/ML
0.08 INJECTION, SOLUTION INTRAMUSCULAR; INTRAVENOUS; SUBCUTANEOUS AS NEEDED
Status: DISCONTINUED | OUTPATIENT
Start: 2024-08-22 | End: 2024-08-22 | Stop reason: HOSPADM

## 2024-08-22 RX ORDER — MORPHINE SULFATE 10 MG/ML
6 INJECTION, SOLUTION INTRAMUSCULAR; INTRAVENOUS EVERY 10 MIN PRN
Status: DISCONTINUED | OUTPATIENT
Start: 2024-08-22 | End: 2024-08-22 | Stop reason: HOSPADM

## 2024-08-22 RX ORDER — BISACODYL 10 MG
10 SUPPOSITORY, RECTAL RECTAL
Status: DISCONTINUED | OUTPATIENT
Start: 2024-08-22 | End: 2024-08-23

## 2024-08-22 RX ORDER — ONDANSETRON 2 MG/ML
4 INJECTION INTRAMUSCULAR; INTRAVENOUS EVERY 6 HOURS PRN
Status: DISCONTINUED | OUTPATIENT
Start: 2024-08-22 | End: 2024-08-23

## 2024-08-22 RX ORDER — LIDOCAINE HYDROCHLORIDE 10 MG/ML
INJECTION, SOLUTION EPIDURAL; INFILTRATION; INTRACAUDAL; PERINEURAL AS NEEDED
Status: DISCONTINUED | OUTPATIENT
Start: 2024-08-22 | End: 2024-08-22 | Stop reason: SURG

## 2024-08-22 RX ORDER — PHENYLEPHRINE HCL 10 MG/ML
VIAL (ML) INJECTION AS NEEDED
Status: DISCONTINUED | OUTPATIENT
Start: 2024-08-22 | End: 2024-08-22 | Stop reason: SURG

## 2024-08-22 RX ORDER — DEXTROSE MONOHYDRATE 25 G/50ML
50 INJECTION, SOLUTION INTRAVENOUS
Status: DISCONTINUED | OUTPATIENT
Start: 2024-08-22 | End: 2024-08-23

## 2024-08-22 RX ORDER — ACETAMINOPHEN 500 MG
1000 TABLET ORAL ONCE
Status: COMPLETED | OUTPATIENT
Start: 2024-08-22 | End: 2024-08-22

## 2024-08-22 RX ORDER — HYDROMORPHONE HYDROCHLORIDE 1 MG/ML
0.2 INJECTION, SOLUTION INTRAMUSCULAR; INTRAVENOUS; SUBCUTANEOUS EVERY 5 MIN PRN
Status: DISCONTINUED | OUTPATIENT
Start: 2024-08-22 | End: 2024-08-22 | Stop reason: HOSPADM

## 2024-08-22 RX ORDER — HYDROMORPHONE HYDROCHLORIDE 1 MG/ML
0.6 INJECTION, SOLUTION INTRAMUSCULAR; INTRAVENOUS; SUBCUTANEOUS EVERY 5 MIN PRN
Status: DISCONTINUED | OUTPATIENT
Start: 2024-08-22 | End: 2024-08-22 | Stop reason: HOSPADM

## 2024-08-22 RX ORDER — TRANEXAMIC ACID 10 MG/ML
INJECTION, SOLUTION INTRAVENOUS AS NEEDED
Status: DISCONTINUED | OUTPATIENT
Start: 2024-08-22 | End: 2024-08-22 | Stop reason: SURG

## 2024-08-22 RX ORDER — BUPIVACAINE HYDROCHLORIDE AND EPINEPHRINE 5; 5 MG/ML; UG/ML
INJECTION, SOLUTION PERINEURAL AS NEEDED
Status: DISCONTINUED | OUTPATIENT
Start: 2024-08-22 | End: 2024-08-22 | Stop reason: HOSPADM

## 2024-08-22 RX ORDER — DIAZEPAM 2 MG
2 TABLET ORAL EVERY 6 HOURS PRN
Status: DISCONTINUED | OUTPATIENT
Start: 2024-08-22 | End: 2024-08-23

## 2024-08-22 RX ORDER — GLYCOPYRROLATE 0.2 MG/ML
INJECTION, SOLUTION INTRAMUSCULAR; INTRAVENOUS AS NEEDED
Status: DISCONTINUED | OUTPATIENT
Start: 2024-08-22 | End: 2024-08-22 | Stop reason: SURG

## 2024-08-22 RX ORDER — LEVOTHYROXINE SODIUM 50 UG/1
50 TABLET ORAL
Status: DISCONTINUED | OUTPATIENT
Start: 2024-08-23 | End: 2024-08-23

## 2024-08-22 RX ORDER — ACETAMINOPHEN 10 MG/ML
1000 INJECTION, SOLUTION INTRAVENOUS ONCE
Status: COMPLETED | OUTPATIENT
Start: 2024-08-22 | End: 2024-08-22

## 2024-08-22 RX ORDER — SODIUM PHOSPHATE, DIBASIC AND SODIUM PHOSPHATE, MONOBASIC 7; 19 G/230ML; G/230ML
1 ENEMA RECTAL ONCE AS NEEDED
Status: DISCONTINUED | OUTPATIENT
Start: 2024-08-22 | End: 2024-08-23

## 2024-08-22 RX ORDER — GABAPENTIN 400 MG/1
400 CAPSULE ORAL 3 TIMES DAILY
Status: DISCONTINUED | OUTPATIENT
Start: 2024-08-22 | End: 2024-08-23

## 2024-08-22 RX ORDER — HYDROMORPHONE HYDROCHLORIDE 1 MG/ML
0.4 INJECTION, SOLUTION INTRAMUSCULAR; INTRAVENOUS; SUBCUTANEOUS EVERY 5 MIN PRN
Status: DISCONTINUED | OUTPATIENT
Start: 2024-08-22 | End: 2024-08-22 | Stop reason: HOSPADM

## 2024-08-22 RX ORDER — DEXAMETHASONE SODIUM PHOSPHATE 4 MG/ML
8 VIAL (ML) INJECTION EVERY 8 HOURS
Status: COMPLETED | OUTPATIENT
Start: 2024-08-22 | End: 2024-08-23

## 2024-08-22 RX ORDER — LISINOPRIL 5 MG/1
5 TABLET ORAL DAILY
Status: DISCONTINUED | OUTPATIENT
Start: 2024-08-23 | End: 2024-08-23

## 2024-08-22 RX ORDER — ONDANSETRON 2 MG/ML
INJECTION INTRAMUSCULAR; INTRAVENOUS AS NEEDED
Status: DISCONTINUED | OUTPATIENT
Start: 2024-08-22 | End: 2024-08-22 | Stop reason: SURG

## 2024-08-22 RX ADMIN — GLYCOPYRROLATE 0.2 MG: 0.2 INJECTION, SOLUTION INTRAMUSCULAR; INTRAVENOUS at 13:35:00

## 2024-08-22 RX ADMIN — SODIUM CHLORIDE, SODIUM LACTATE, POTASSIUM CHLORIDE, CALCIUM CHLORIDE: 600; 310; 30; 20 INJECTION, SOLUTION INTRAVENOUS at 11:25:00

## 2024-08-22 RX ADMIN — EPHEDRINE SULFATE 10 MG: 50 INJECTION, SOLUTION INTRAVENOUS at 12:00:00

## 2024-08-22 RX ADMIN — EPHEDRINE SULFATE 10 MG: 50 INJECTION, SOLUTION INTRAVENOUS at 11:55:00

## 2024-08-22 RX ADMIN — EPHEDRINE SULFATE 5 MG: 50 INJECTION, SOLUTION INTRAVENOUS at 11:42:00

## 2024-08-22 RX ADMIN — ROCURONIUM BROMIDE 10 MG: 10 INJECTION, SOLUTION INTRAVENOUS at 12:48:00

## 2024-08-22 RX ADMIN — LIDOCAINE HYDROCHLORIDE 50 MG: 10 INJECTION, SOLUTION EPIDURAL; INFILTRATION; INTRACAUDAL; PERINEURAL at 11:31:00

## 2024-08-22 RX ADMIN — ONDANSETRON 4 MG: 2 INJECTION INTRAMUSCULAR; INTRAVENOUS at 11:33:00

## 2024-08-22 RX ADMIN — ROCURONIUM BROMIDE 40 MG: 10 INJECTION, SOLUTION INTRAVENOUS at 11:31:00

## 2024-08-22 RX ADMIN — EPHEDRINE SULFATE 10 MG: 50 INJECTION, SOLUTION INTRAVENOUS at 12:28:00

## 2024-08-22 RX ADMIN — PHENYLEPHRINE HCL 100 MCG: 10 MG/ML VIAL (ML) INJECTION at 12:31:00

## 2024-08-22 RX ADMIN — SODIUM CHLORIDE, SODIUM LACTATE, POTASSIUM CHLORIDE, CALCIUM CHLORIDE: 600; 310; 30; 20 INJECTION, SOLUTION INTRAVENOUS at 13:35:00

## 2024-08-22 RX ADMIN — LIDOCAINE HYDROCHLORIDE 4 ML: 40 SOLUTION TOPICAL at 11:33:00

## 2024-08-22 RX ADMIN — SODIUM CHLORIDE, SODIUM LACTATE, POTASSIUM CHLORIDE, CALCIUM CHLORIDE: 600; 310; 30; 20 INJECTION, SOLUTION INTRAVENOUS at 14:00:00

## 2024-08-22 RX ADMIN — DEXAMETHASONE SODIUM PHOSPHATE 4 MG: 4 MG/ML VIAL (ML) INJECTION at 11:33:00

## 2024-08-22 RX ADMIN — TRANEXAMIC ACID 1000 MG: 10 INJECTION, SOLUTION INTRAVENOUS at 12:26:00

## 2024-08-22 NOTE — ANESTHESIA PROCEDURE NOTES
Airway  Date/Time: 8/22/2024 11:33 AM  Urgency: Elective      General Information and Staff    Patient location during procedure: OR  Anesthesiologist: Bandar Johnson MD  Resident/CRNA: Danette Adames CRNA  Performed: CRNA and anesthesiologist   Performed by: Danette Adames CRNA  Authorized by: Bandar Johnson MD      Indications and Patient Condition  Indications for airway management: anesthesia  Sedation level: deep  Preoxygenated: yes  Patient position: sniffing  Mask difficulty assessment: 2 - vent by mask + OA or adjuvant +/- NMBA    Final Airway Details  Final airway type: endotracheal airway      Successful airway: ETT  Cuffed: yes   Successful intubation technique: Video laryngoscopy  Endotracheal tube insertion site: oral  Blade: Frankie  Blade size: #3  ETT size (mm): 3.0    Cormack-Lehane Classification: grade I - full view of glottis  Placement verified by: capnometry   Measured from: teeth  ETT to teeth (cm): 21  Number of attempts at approach: 1

## 2024-08-22 NOTE — H&P
History & Physical Examination    Patient Name: Petty Valdovinos  MRN: [id number]  [facility]: [id number]  YOB: 1950    Diagnosis: L4 compression fracture, L3-5 severe stenosis with left leg weakness    Present Illness: 73 yo female with pain shooting from her low back into her hamstrings, calves, and into her ankles. She denies any pain radiating into her feet. She does see a local pain clinic, where they prescribe her Percocet, gabapentin and morphine. Currently, she states she can only stand for 5 minutes before she has to sit down due to her pain. She denies any overt weakness in the legs, but rather pain. Has failed nonoperative treatment.   Medications Prior to Admission   Medication Sig Dispense Refill Last Dose    mupirocin 2 % External Ointment Apply ointment twice a day for five days prior to surgery - once in the morning and once in the evening 22 g 0 8/21/2024 at 2100    Multiple Vitamins-Minerals (ICAPS AREDS 2 OR) Take 1 tablet by mouth in the morning and 1 tablet before bedtime.   Past Week    Calcium Carbonate-Vit D-Min (CALCIUM 1200 OR) Take 1 tablet by mouth daily.   Past Week    DULoxetine 30 MG Oral Cap DR Particles Take 1 capsule (30 mg total) by mouth daily.   8/22/2024 at 0800    apixaban 5 MG Oral Tab Take 1 tablet (5 mg total) by mouth 2 (two) times daily.   8/18/2024    fenofibrate micronized 134 MG Oral Cap Take 1 capsule (134 mg total) by mouth daily.   8/21/2024 at 080    gabapentin 400 MG Oral Cap Take 1 capsule (400 mg total) by mouth 3 (three) times daily.   8/21/2024 at 1700    ferrous sulfate 325 (65 FE) MG Oral Tab EC Take 1 tablet (325 mg total) by mouth 3 (three) times a week. Monday, Wednesday, Friday   Past Week    levothyroxine 50 MCG Oral Tab Take 1 tablet (50 mcg total) by mouth before breakfast.   8/21/2024 at 0800    lisinopril 5 MG Oral Tab Take 1 tablet (5 mg total) by mouth daily.   8/21/2024 at 0800    metFORMIN 500 MG Oral Tab Take 1 tablet (500 mg total) by  mouth 2 (two) times daily with meals.   8/21/2024 at 2100    montelukast 10 MG Oral Tab Take 1 tablet (10 mg total) by mouth daily.   8/21/2024 at 0800    morphINE 15 MG Oral Tab Take 1 tablet (15 mg total) by mouth in the morning and 1 tablet (15 mg total) before bedtime.   8/22/2024 at 0700    Multiple Vitamin (MULTIVITAMIN ADULT OR) Take 1 tablet by mouth daily.   Past Week    oxyCODONE-acetaminophen 5-325 MG Oral Tab Take 1 tablet by mouth in the morning and 1 tablet before bedtime.   8/21/2024 at 2100    simvastatin 10 MG Oral Tab Take 1 tablet (10 mg total) by mouth nightly.   8/21/2024 at 2100    fluticasone-umeclidin-vilant (TRELEGY ELLIPTA) 200-62.5-25 MCG/ACT Inhalation Aerosol Powder, Breath Activated Inhale 1 puff into the lungs daily.   8/21/2024 at 0800    cholecalciferol 50 MCG (2000 UT) Oral Tab Take 1 tablet (2,000 Units total) by mouth daily.   Past Week    Albuterol Sulfate 108 (90 Base) MCG/ACT Inhalation Aerosol Powder, Breath Activated Inhale 1-2 puffs into the lungs as needed (asthma).   More than a month at 0800       Current Facility-Administered Medications   Medication Dose Route Frequency    lactated ringers infusion   Intravenous Continuous    ceFAZolin (Ancef) 2g in 10mL IV syringe premix  2 g Intravenous Once    And    vancomycin (Vancocin) 1,000 mg in sodium chloride 0.9% 250 mL IVPB-ADDV  15 mg/kg Intravenous Once       Allergies: Sulfa antibiotics    Past Medical History:    Anemia    Anxiety and depression    Anxiety state    Asthma (HCC)    Back problem    Calculus of kidney    Cataract    Colitis    ulcerative colitis    Colostomy present (HCC)    ileostomy in place    Deep vein thrombosis (HCC)    Depression    Diabetes (HCC)    Type 2    Disorder of thyroid    High blood pressure    High cholesterol    History of blood transfusion    after colectomy    HTN (hypertension)    Hyperlipidemia    Hypothyroidism    Kidney stones    Macular degeneration    Osteoarthritis     Osteoporosis    Pneumonia    Pulmonary embolism (HCC)    saddle- possibly due to immobility/fall    Pyelonephritis    Spinal stenosis    lumbar    Stage 3a chronic kidney disease (CKD) (HCC)    Visual impairment    readers     Past Surgical History:   Procedure Laterality Date    Colectomy      Colon surgery      ileostomy bag right side of abdomen    Ir ivc filter placement Right 07/30/2024    Lumbar spine surgery      kyphoplasty    Other surgical history      ureteric stent- due to kidney stones    Other surgical history  12/2023    saddle pulmonary embolism removed    Parathyroidectomy       Family History   Problem Relation Age of Onset    Other (Other) Father         MI    Cancer Mother         breast     Social History     Tobacco Use    Smoking status: Never     Passive exposure: Never    Smokeless tobacco: Never   Substance Use Topics    Alcohol use: Never         SYSTEM Check if Review is Normal Check if Physical Exam is Normal If not normal, please explain:   HEENT [ X] [ ]    NECK & BACK [ X] [ ]    HEART [ X] [ ]    LUNGS [ X] [ ]    ABDOMEN [ X] [ ]    UROGENITAL [ X] [ ]    UPPER EXTREMITIES [ X] [ ]    LOWER EXTREMITIES [ ] [ ] Strength:  Right iliopsoas: 5/5, Left iliopsoas 5/5  Right quadriceps 5-/5, Left quadriceps 4+/5  Right tibialis anterior 5/5, Left tibialis anterior 5/5  Right EHL 5/5, Left EHL strength 5/5  Right gastroc strength 5/5, Left gastroc strength 5/5     Plan: L3-4 and L4-5 laminectomy with possible in situ fusion.  [ x ] I have discussed the risks and benefits and alternatives with the patient/family.  They understand and agree to proceed with plan of care.  [ x ] I have reviewed the History and Physical done within the last 30 days.  Any changes noted above.    Bret Cervantes PA-C  8/22/2024  9:56 AM

## 2024-08-22 NOTE — OPERATIVE REPORT
PATIENT  Petty Valdovinos    DATE OF SURGERY  8/22/24    SURGEON   Bay Krishna MD    ASSISTANT  ELO Dobson    PREOPERATIVE DIAGNOSIS   L3-L5 spondylosis  L3-L5 spinal stenosis  History of L4 compression fracture    POSTOPERATIVE DIAGNOSIS   Same    PROCEDURES   1. L3-L5 laminectomy under direct visualization.   2. L3-L5 lumbar fusion  3. Use of fluoroscopy.   4. Use of intra-operative microscope.     DESCRIPTION OF PROCEDURE   Bilateral thecal sac decompression was performed. Exiting nerve roots were identified.     ANESTHESIA   General endotracheal    COMPLICATIONS   None.     BLOOD LOSS   Minimal.     INDICATIONS   The patient is a 74 year-old female with neurogenic claudication and   bilateral lumbar radiculopathy. She had previous sustained a compression fracture at L4. This was treated with another physician with kyphoplasty. Due to severe stenosis, she has had persistent symptoms.  She understood the risks and benefits, including risks of failure to improve, neurologic deterioration, infection, durotomy, continued low back pain, and progression of her spondylolisthesis were explained to the patient at length. The patient also understood the risks of anesthesia which include possible stroke, MI, death.       TECHNIQUE   The patient was brought to the operating room. General   anesthesia with endotracheal intubation was performed. The patient was positioned prone on the Bhavin spinal frame. Care was taken to ensure bony prominences   were well padded. The lower back was prepped and draped in the   Usual sterile fashion. A surgical timeout was performed according to the WHO standards identifying the appropriate patient, surgical site, and surgical procedure.   Under fluoroscopy, the operative levels were identified. A midline incision was made after local anesthetic was injected into the tissue. Bovie cautery was used to subperiosteally dissect the midline muscle from the midline elements out to the  medial portion of the facets. A fluoroscopy image was taken to confirm the correct levels.   Using a mayela, the lamina and medial facets bilaterally were removed down to the ligamentum leaving the pars intact at L3-4 and L4-5. At these levels, the ligamentum was removed piecemeal and the dura was exposed. The remaining lamina was removed.   An angled hockey stick elevator was tracked underneath the thecal sac proximally and distally and ensured adequate decompression with no remaining fragments compressing the nerve root or the thecal sac. At this point, we were satisfied with the extent of the decompression, the neural elements were seen to be free.     The lamina that was removed was used as posterior bone graft at L3-4, and L4-5 with the pars, facets, and tranverse processes were decorticated.     The wound was thoroughly irrigated and hemostasis was ensured.   Sterile solution was infiltrated.   Meticulous hemostasis was achieved.   Fascia was approximated with 0 Vicryl suture. Subcutaneous tissue   and skin was approximated with suture. Sterile dressing applied.   The patient returned to recovery in stable condition.     I was present for and performed the entire procedure.

## 2024-08-22 NOTE — ANESTHESIA POSTPROCEDURE EVALUATION
Patient: Petty Valdovinos    Procedure Summary       Date: 08/22/24 Room / Location: [facility] MAIN OR  / [facility] MAIN OR    Anesthesia Start: 1125 Anesthesia Stop: 1404    Procedure: L3-4, L4-5 laminectomy, possible in situ fusion (Spine Lumbar) Diagnosis: (Compression fracture, intervertebral disc disorders, radiculopathy)    Surgeons: Bya Krishna MD Anesthesiologist: Bandar Johnson MD    Anesthesia Type: general ASA Status: 3            Anesthesia Type: general    Vitals Value Taken Time   /89 08/22/24 1404   Temp 98.2 08/22/24 1404   Pulse 104 08/22/24 1404   Resp 23 08/22/24 1404   SpO2 99 % 08/22/24 1404   Vitals shown include unfiled device data.    [facility] AN Post Evaluation:   Patient Evaluated in PACU  Patient Participation: complete - patient participated  Level of Consciousness: awake and alert  Pain Score: 0  Pain Management: adequate  Airway Patency:patent  Dental exam unchanged from preop  Yes    Cardiovascular Status: acceptable  Respiratory Status: acceptable and nasal cannula  Postoperative Hydration acceptable  Comments: Accuchpadma Adames CRNA  8/22/2024 2:04 PM

## 2024-08-22 NOTE — ANESTHESIA PREPROCEDURE EVALUATION
Anesthesia PreOp Note    HPI:     Petty Valdovinos is a 74 year old female who presents for preoperative consultation requested by: Bay Krishna MD    Date of Surgery: 8/22/2024    Procedure(s):  L3-4, L4-5 laminectomy, possible in situ fusion  Indication: Compression fracture, intervertebral disc disorders, radiculopathy    Relevant Problems   No relevant active problems       NPO:  Last Liquid Consumption Date: 08/21/24  Last Liquid Consumption Time: 2100  Last Solid Consumption Date: 08/21/24  Last Solid Consumption Time: 1900  Last Liquid Consumption Date: 08/21/24          History Review:  Patient Active Problem List    Diagnosis Date Noted    Hx of pulmonary embolus 07/24/2024    Lumbar spinal stenosis 07/24/2024    CKD stage 3a, GFR 45-59 ml/min ([facility]) 07/24/2024    Age-related osteoporosis with current pathological fracture 07/24/2024    Type 2 diabetes mellitus with hyperglycemia ([facility]) 07/24/2024    Essential hypertension 07/24/2024    High cholesterol 07/24/2024    Asthma ([facility]) 07/24/2024    Chronic anticoagulation 07/24/2024       Past Medical History:    Anemia    Anxiety and depression    Anxiety state    Asthma ([facility])    Back problem    Calculus of kidney    Cataract    Colitis    ulcerative colitis    Colostomy present ([facility])    ileostomy in place    Deep vein thrombosis (HCC)    Depression    Diabetes ([facility])    Type 2    Disorder of thyroid    High blood pressure    High cholesterol    History of blood transfusion    after colectomy    HTN (hypertension)    Hyperlipidemia    Hypothyroidism    Kidney stones    Macular degeneration    Osteoarthritis    Osteoporosis    Pneumonia    Pulmonary embolism ([facility])    saddle- possibly due to immobility/fall    Pyelonephritis    Spinal stenosis    lumbar    Stage 3a chronic kidney disease (CKD) ([facility])    Visual impairment    readers       Past Surgical History:   Procedure Laterality Date    Colectomy      Colon surgery      ileostomy bag right side of abdomen     Ir ivc filter placement Right 07/30/2024    Lumbar spine surgery      kyphoplasty    Other surgical history      ureteric stent- due to kidney stones    Other surgical history  12/2023    saddle pulmonary embolism removed    Parathyroidectomy         Medications Prior to Admission   Medication Sig Dispense Refill Last Dose    mupirocin 2 % External Ointment Apply ointment twice a day for five days prior to surgery - once in the morning and once in the evening 22 g 0 8/21/2024 at 2100    Multiple Vitamins-Minerals (ICAPS AREDS 2 OR) Take 1 tablet by mouth in the morning and 1 tablet before bedtime.   Past Week    Calcium Carbonate-Vit D-Min (CALCIUM 1200 OR) Take 1 tablet by mouth daily.   Past Week    DULoxetine 30 MG Oral Cap DR Particles Take 1 capsule (30 mg total) by mouth daily.   8/22/2024 at 0800    apixaban 5 MG Oral Tab Take 1 tablet (5 mg total) by mouth 2 (two) times daily.   8/18/2024    fenofibrate micronized 134 MG Oral Cap Take 1 capsule (134 mg total) by mouth daily.   8/21/2024 at 080    gabapentin 400 MG Oral Cap Take 1 capsule (400 mg total) by mouth 3 (three) times daily.   8/21/2024 at 1700    ferrous sulfate 325 (65 FE) MG Oral Tab EC Take 1 tablet (325 mg total) by mouth 3 (three) times a week. Monday, Wednesday, Friday   Past Week    levothyroxine 50 MCG Oral Tab Take 1 tablet (50 mcg total) by mouth before breakfast.   8/21/2024 at 0800    lisinopril 5 MG Oral Tab Take 1 tablet (5 mg total) by mouth daily.   8/21/2024 at 0800    metFORMIN 500 MG Oral Tab Take 1 tablet (500 mg total) by mouth 2 (two) times daily with meals.   8/21/2024 at 2100    montelukast 10 MG Oral Tab Take 1 tablet (10 mg total) by mouth daily.   8/21/2024 at 0800    morphINE 15 MG Oral Tab Take 1 tablet (15 mg total) by mouth in the morning and 1 tablet (15 mg total) before bedtime.   8/22/2024 at 0700    Multiple Vitamin (MULTIVITAMIN ADULT OR) Take 1 tablet by mouth daily.   Past Week    oxyCODONE-acetaminophen 5-325  MG Oral Tab Take 1 tablet by mouth in the morning and 1 tablet before bedtime.   8/21/2024 at 2100    simvastatin 10 MG Oral Tab Take 1 tablet (10 mg total) by mouth nightly.   8/21/2024 at 2100    fluticasone-umeclidin-vilant (TRELEGY ELLIPTA) 200-62.5-25 MCG/ACT Inhalation Aerosol Powder, Breath Activated Inhale 1 puff into the lungs daily.   8/21/2024 at 0800    cholecalciferol 50 MCG (2000 UT) Oral Tab Take 1 tablet (2,000 Units total) by mouth daily.   Past Week    Albuterol Sulfate 108 (90 Base) MCG/ACT Inhalation Aerosol Powder, Breath Activated Inhale 1-2 puffs into the lungs as needed (asthma).   More than a month at 0800     Current Facility-Administered Medications Ordered in Epic   Medication Dose Route Frequency Provider Last Rate Last Admin    lactated ringers infusion   Intravenous Continuous Bay Krishna MD        acetaminophen (Tylenol Extra Strength) tab 1,000 mg  1,000 mg Oral Once Bay Krishna MD        ceFAZolin (Ancef) 2g in 10mL IV syringe premix  2 g Intravenous Once Bay Krishna MD        And    vancomycin (Vancocin) 1,000 mg in sodium chloride 0.9% 250 mL IVPB-ADDV  15 mg/kg Intravenous Once Bay Krishna MD         No current [facility]-ordered outpatient medications on file.       Allergies   Allergen Reactions    Sulfa Antibiotics HIVES       Family History   Problem Relation Age of Onset    Other (Other) Father         MI    Cancer Mother         breast     Social History     Socioeconomic History    Marital status:    Tobacco Use    Smoking status: Never     Passive exposure: Never    Smokeless tobacco: Never   Vaping Use    Vaping status: Never Used   Substance and Sexual Activity    Alcohol use: Never    Drug use: Never       Available pre-op labs reviewed.  Lab Results   Component Value Date    WBC 9.0 07/24/2024    RBC 4.43 07/24/2024    HGB 13.1 07/24/2024    HCT 40.8 07/24/2024    MCV 92.1 07/24/2024    MCH 29.6 07/24/2024    MCHC 32.1 07/24/2024    RDW 13.7  07/24/2024    .0 07/24/2024     Lab Results   Component Value Date     07/24/2024    K 4.4 07/24/2024     07/24/2024    CO2 25.0 07/24/2024    BUN 15 07/24/2024    CREATSERUM 0.78 07/24/2024    GLU 99 07/24/2024    CA 9.8 07/24/2024          Vital Signs:  Body mass index is 27.59 kg/m².   height is 1.549 m (5' 1\") and weight is 66.2 kg (146 lb). Her oral temperature is 97.7 °F (36.5 °C). Her blood pressure is 144/80 and her pulse is 72. Her respiration is 16 and oxygen saturation is 97%.   Vitals:    08/09/24 1144 08/22/24 0921   BP:  144/80   Pulse:  72   Resp:  16   Temp:  97.7 °F (36.5 °C)   TempSrc:  Oral   SpO2:  97%   Weight: 67.1 kg (148 lb) 66.2 kg (146 lb)   Height: 1.549 m (5' 1\") 1.549 m (5' 1\")        Anesthesia Evaluation     Patient summary reviewed and Nursing notes reviewed    No history of anesthetic complications   Airway   Mallampati: III  TM distance: >3 FB  Neck ROM: full  Dental - Dentition appears grossly intact     Pulmonary - normal exam   (+) asthma  Cardiovascular - normal exam  (+) hypertension    Neuro/Psych    (+)  anxiety/panic attacks,  depression      GI/Hepatic/Renal    (+) chronic renal disease    Endo/Other    (+) diabetes mellitus, hypothyroidism  Abdominal                  Anesthesia Plan:   ASA:  3  Plan:   General  Airway:  ETT  Post-op Pain Management: IV analgesics  Plan Comments: I have discussed the anesthetic plan, major risks and alternatives with the patient and answered all questions. The patient desires to proceed with surgery and anesthesia as planned.     HX DVT/PE - has IVC filter, last eliquis 4 days ago  Informed Consent Plan and Risks Discussed With:  Patient and child/children  Discussed plan with:  CRNA      I have informed Petty YANICK WalkerBonifacio and/or legal guardian or family member of the nature of the anesthetic plan, benefits, risks including possible dental damage if relevant, major complications, and any alternative forms of anesthetic  management.   All of the patient's questions were answered to the best of my ability. The patient desires the anesthetic management as planned.  Bandar Johnson MD  8/22/2024 9:24 AM  Present on Admission:  **None**

## 2024-08-23 VITALS
HEART RATE: 88 BPM | SYSTOLIC BLOOD PRESSURE: 111 MMHG | HEIGHT: 61 IN | WEIGHT: 146 LBS | BODY MASS INDEX: 27.56 KG/M2 | RESPIRATION RATE: 16 BRPM | OXYGEN SATURATION: 95 % | DIASTOLIC BLOOD PRESSURE: 70 MMHG | TEMPERATURE: 99 F

## 2024-08-23 LAB
ANION GAP SERPL CALC-SCNC: 7 MMOL/L (ref 0–18)
BUN BLD-MCNC: 12 MG/DL (ref 9–23)
BUN/CREAT SERPL: 16 (ref 10–20)
CALCIUM BLD-MCNC: 9.6 MG/DL (ref 8.7–10.4)
CHLORIDE SERPL-SCNC: 105 MMOL/L (ref 98–112)
CO2 SERPL-SCNC: 24 MMOL/L (ref 21–32)
CREAT BLD-MCNC: 0.75 MG/DL
DEPRECATED RDW RBC AUTO: 43.7 FL (ref 35.1–46.3)
EGFRCR SERPLBLD CKD-EPI 2021: 84 ML/MIN/1.73M2 (ref 60–?)
ERYTHROCYTE [DISTWIDTH] IN BLOOD BY AUTOMATED COUNT: 13.2 % (ref 11–15)
EST. AVERAGE GLUCOSE BLD GHB EST-MCNC: 108 MG/DL (ref 68–126)
GLUCOSE BLD-MCNC: 139 MG/DL (ref 70–99)
GLUCOSE BLDC GLUCOMTR-MCNC: 126 MG/DL (ref 70–99)
GLUCOSE BLDC GLUCOMTR-MCNC: 144 MG/DL (ref 70–99)
GLUCOSE BLDC GLUCOMTR-MCNC: 192 MG/DL (ref 70–99)
HBA1C MFR BLD: 5.4 % (ref ?–5.7)
HCT VFR BLD AUTO: 35.1 %
HGB BLD-MCNC: 11.8 G/DL
MCH RBC QN AUTO: 30.2 PG (ref 26–34)
MCHC RBC AUTO-ENTMCNC: 33.6 G/DL (ref 31–37)
MCV RBC AUTO: 89.8 FL
OSMOLALITY SERPL CALC.SUM OF ELEC: 284 MOSM/KG (ref 275–295)
PLATELET # BLD AUTO: 311 10(3)UL (ref 150–450)
POTASSIUM SERPL-SCNC: 4.6 MMOL/L (ref 3.5–5.1)
RBC # BLD AUTO: 3.91 X10(6)UL
SODIUM SERPL-SCNC: 136 MMOL/L (ref 136–145)
WBC # BLD AUTO: 14.3 X10(3) UL (ref 4–11)

## 2024-08-23 PROCEDURE — 85027 COMPLETE CBC AUTOMATED: CPT | Performed by: FAMILY MEDICINE

## 2024-08-23 PROCEDURE — 97530 THERAPEUTIC ACTIVITIES: CPT

## 2024-08-23 PROCEDURE — 97161 PT EVAL LOW COMPLEX 20 MIN: CPT

## 2024-08-23 PROCEDURE — 83036 HEMOGLOBIN GLYCOSYLATED A1C: CPT | Performed by: FAMILY MEDICINE

## 2024-08-23 PROCEDURE — 97535 SELF CARE MNGMENT TRAINING: CPT

## 2024-08-23 PROCEDURE — 80048 BASIC METABOLIC PNL TOTAL CA: CPT | Performed by: FAMILY MEDICINE

## 2024-08-23 PROCEDURE — 97165 OT EVAL LOW COMPLEX 30 MIN: CPT

## 2024-08-23 PROCEDURE — 82962 GLUCOSE BLOOD TEST: CPT

## 2024-08-23 RX ORDER — PSEUDOEPHEDRINE HCL 30 MG
100 TABLET ORAL 2 TIMES DAILY
Qty: 30 CAPSULE | Refills: 0 | Status: SHIPPED | OUTPATIENT
Start: 2024-08-23

## 2024-08-23 NOTE — PROGRESS NOTES
[facility]  part of [facility]    Progress Note    Petty Valdovinos Patient Status:  Outpatient in a Bed    1950 MRN [id number]   Location [facility] Attending Bay Krishna MD   Hosp Day # 0 PCP Blanka Grant MD     SUBJECTIVE:  Interval History: The patient is doing well overall but has continued complaints of low back and leg pain.  She was on baseline Morphine sulfate 15 mg twice daily and Percocet 10 mg twice daily.  She denies any fever, chills, gross bowel/bladder incontinence or saddle anesthesia. They also deny calf pain, cramping, chest pain, difficulty breathing or shortness of breath. They are getting up for transfers and walking without difficulty. Her hemovac output over the past 8 hour shift was 60 mL.     Post-Op Day: 1    OBJECTIVE:  Blood pressure 136/90, pulse 101, temperature 98.5 °F (36.9 °C), temperature source Temporal, resp. rate 14, height 5' 1\" (1.549 m), weight 146 lb (66.2 kg), SpO2 98%.     Ortho Spine Exam:  Incisional dressings are clean, dry and intact. Hemovac drain site intact.  Adjacent skin is without erythema or edema.  Motor exam is 5-/5 bilateral lower extremity.  2+ dorsalis pedis and posterior tibialis pulses.  Sensation is intact distally. Calves are soft and non-tender to palpation.      ASSESSMENT/PLAN:    S/P L3-5 laminectomy     1) Continue current pain management protocol for now with pain management consulted.   2) Plan to discharge home pending clearance from the medicine/hospitalist team and PT/OT, pain management and hemovac output-will recheck at 1400 today.   3) Post operative medications were sent to patient's pharmacy outside of [facility]   4) Plan to have patient remove their dressing on POD#3  5) Follow up in clinic in 2 to 3 weeks, appointment should already be scheduled, please have patient call [phone] to confirm or change date/location.   6) All questions answered by the bedside today     Tahmina Paulino,  SARAH  8/23/2024  7:09 AM

## 2024-08-23 NOTE — OCCUPATIONAL THERAPY NOTE
OCCUPATIONAL THERAPY EVALUATION - INPATIENT     Room Number: Room 1/Room 1-A  Evaluation Date: 8/23/2024  Type of Evaluation: Initial  Presenting Problem: L3-5 laminectomy and fusion    Physician Order: IP Consult to Occupational Therapy  Reason for Therapy: ADL/IADL Dysfunction and Discharge Planning    OCCUPATIONAL THERAPY ASSESSMENT   Patient is a 74 year old female admitted 8/22/2024.  Prior to admission, patient's baseline is mod I ADLs.  Patient is currently functioning below baseline with ADLs/mobility.  Patient is requiring stand-by assist and moderate assist as a result of the following impairments: decreased functional strength, decreased functional reach, decreased endurance, pain, impaired standing balance, decreased muscular endurance, and limited trunk ROM with spine prec. Occupational Therapy will continue to follow for duration of hospitalization.    Patient will benefit from continued skilled OT Services at discharge to promote prior level of function and safety with additional support and return home with home health OT    PLAN  OT Treatment Plan: Balance activities;ADL training;Functional transfer training;Patient/Family education;Patient/Family training;Equipment eval/education;Compensatory technique education  OT Device Recommendations: Other (Comment) (owns AE needed)    OCCUPATIONAL THERAPY MEDICAL/SOCIAL HISTORY   Problem List   Principal Problem:    Lumbar spinal stenosis  Active Problems:    Hx of pulmonary embolus    CKD stage 3a, GFR 45-59 ml/min ([facility])    Type 2 diabetes mellitus with hyperglycemia ([facility])    Essential hypertension    High cholesterol    Chronic anticoagulation    S/P lumbar spinal fusion    HOME SITUATION  Type of Home: House  Home Layout: One level  Lives With: Daughter; Son (son home during day / dtr works)  Toilet and Equipment: Comfort height toilet; 3-in-1 commode  Shower/Tub and Equipment: Walk-in shower; Shower chair  Other Equipment: Reacher; Other (Comment) (bed  rail, walker)  Drives: No  Patient Regularly Uses: -- (Home bound ambulator with RW --limited community for MD appt.   Indep ADL --hx of multiple falls---BSC)      Prior Level of [place]: mod I with ADLs, mostly homebound. Pt reported spending a lot of time in bed prior to surgery with severe leg and back pain. Lives with son and dtr. Reported they will be there in the daytime to assist for 2 days and then they go to work on Monday    SUBJECTIVE  \"I am getting more pain in my legs\"- with prolonged bathroom mobility/ADLs    OCCUPATIONAL THERAPY EXAMINATION   OBJECTIVE  Precautions: Spine  Fall Risk: Standard fall risk    WEIGHT BEARING RESTRICTION     PAIN ASSESSMENT  Ratin  Location: back, BLE with activity  Management Techniques: Relaxation; Repositioning; Nurse notified      COGNITION  Overall Cognitive Status:  WFL - within functional limits    RANGE OF MOTION   Upper extremity ROM is within functional limits     STRENGTH ASSESSMENT  Upper extremity strength not tested with spine prec    ACTIVITIES OF DAILY LIVING ASSESSMENT  AM-PAC ‘6-Clicks’ Inpatient Daily Activity Short Form  How much help from another person does the patient currently need…  -   Putting on and taking off regular lower body clothing?: A Lot  -   Bathing (including washing, rinsing, drying)?: A Lot  -   Toileting, which includes using toilet, bedpan or urinal? : A Little  -   Putting on and taking off regular upper body clothing?: None  -   Taking care of personal grooming such as brushing teeth?: A Little  -   Eating meals?: None    AM-PAC Score:  Score: 18  Approx Degree of Impairment: 46.65%  Standardized Score (AM-PAC Scale): 38.66  CMS Modifier (G-Code): CK    FUNCTIONAL TRANSFER ASSESSMENT  Sit to Stand: Edge of Bed  Edge of Bed: Contact Guard Assist  Toilet Transfer: Contact Guard Assist (grab bars)    BED MOBILITY  Supine to Sit : Contact Guard Assist (cues for log roll techs)  Sit to Supine (OT): Stand-by Assist (cues for log  roll techs)    BALANCE ASSESSMENT  Static Sitting: Supervision  Static Standing: Contact Guard Assist (progressing to SBA)    FUNCTIONAL ADL ASSESSMENT  Eating: Independent  Grooming Standing: Stand-by Assist  LB Dressing Seated: Moderate Assist  Toileting Seated: Modified Independent (pericare)  Toileting Standing: Contact Guard Assist (clothing management/pampers cues to avoid bending with clothing management and educated on use of reacher as needed)       Skilled Therapy Provided: RN approved session. Received patient in bed. Performed ADLs/functional mobility/transfers as stated above. Primarily limited by pain, balance, activity tolerance, LE strength.  Provided skilled cues/education on OT role, POC, functional transfer training, BADL training, compensatory strategies, AE usage, energy conservation, balance and patient with good receptiveness. At the end of session, patient left in bed per request with needs met, alarm on and RN aware of session.    EDUCATION PROVIDED  Patient: Role of Occupational Therapy; Plan of Care; Adaptive Equipment Recommendations; Functional Transfer Techniques; Fall Prevention; Surgical Precautions; Posture/Positioning; Compensatory ADL Techniques; Proper Body Mechanics  Patient's Response to Education: Verbalized Understanding; Returned Demonstration    The patient's Approx Degree of Impairment: 46.65% has been calculated based on documentation in the [facility] '6 clicks' Inpatient Daily Activity Short Form.  Research supports that patients with this level of impairment may benefit from HHOT.  Final disposition will be made by interdisciplinary medical team.    Patient End of Session: In bed;Needs met;Call light within reach;RN aware of session/findings;All patient questions and concerns addressed;Ice applied    OT Goals  Patient self-stated goal is: less pain, increased assist     Patient will complete LE dressing with mod I and AE  Comment:     Patient will complete toilet transfer  with mod I  Comment:     Patient will complete self care task at sink level with mod I   Comment:    Patient will independently recall spine precautions  Comment:         Goals  on: 2024  Frequency: 3-5x/wk    Patient Evaluation Complexity Level:   Occupational Profile/Medical History LOW - Brief history including review of medical or therapy records    Specific performance deficits impacting engagement in ADL/IADL LOW  1 - 3 performance deficits    Client Assessment/Performance Deficits LOW - No comorbidities nor modifications of tasks    Clinical Decision Making LOW - Analysis of occupational profile, problem-focused assessments, limited treatment options    Overall Complexity LOW     OT Session Time: 25 minutes  Self-Care Home Management: 15 minutes  10 min lana Gibson, OTR/L

## 2024-08-23 NOTE — PROGRESS NOTES
[facility]  part of [facility]    Progress Note    Petty Valdovinos Patient Status:  Outpatient in a Bed    1950 MRN [id number]   Location [facility] Attending Bay Krishna MD   Hosp Day # 0 PCP Blanka Grant MD       Subjective:   Petty Valdovinos is a(n) 74 year old female is doing OK except for pain control today.     Objective:   Vital Signs:  Blood pressure 124/81, pulse 87, temperature 98.2 °F (36.8 °C), temperature source Oral, resp. rate 16, height 5' 1\" (1.549 m), weight 146 lb (66.2 kg), SpO2 98%.     General: No acute distress. Alert and oriented x 3.  HEENT: Moist mucous membranes.   Neck: No lymphadenopathy.  No JVD. No carotid bruits.  Respiratory: Clear to auscultation bilaterally.  No wheezes. No rhonchi.  Cardiovascular: S1, S2.  Regular rate and rhythm.  No murmurs. Equal pulses   Abdomen: Soft, nontender, nondistended.  Positive bowel sounds. No rebound tenderness  Neurologic: No focal neurological deficits.  Musculoskeletal: Full range of motion of all extremities.  No swelling noted.  Integument: No lesions. No erythema.  Psychiatric: Appropriate mood and affect.      Assessment and Plan:     Lumbar spinal stenosis  S/P fusion POD#1      Hx of pulmonary embolus        CKD stage 3a, GFR 45-59 ml/min (HCC)        Type 2 diabetes mellitus with hyperglycemia (HCC)        Essential hypertension        High cholesterol        Chronic anticoagulation        S/P lumbar spinal fusion    Pain control  PT/OT  Discharge planning        Results:     Lab Results   Component Value Date    WBC 14.3 (H) 2024    HGB 11.8 (L) 2024    HCT 35.1 2024    .0 2024    CREATSERUM 0.75 2024    BUN 12 2024     2024    K 4.6 2024     2024    CO2 24.0 2024     (H) 2024    CA 9.6 2024    ALB 4.9 (H) 2024    ALKPHO 72 2024    BILT 0.6 2024    TP 7.1 2024    AST 32  07/24/2024    ALT 15 07/24/2024    PTT 41.3 (H) 07/24/2024    INR 0.99 07/24/2024       No results found.                Jayy Kauffman DO  8/23/2024

## 2024-08-23 NOTE — OCCUPATIONAL THERAPY NOTE
Order received and chart reviewed. Attempted to see pt for OT today. Pt just returned to supine, RN requesting to return later. Will re-attempt when pt is able to participate.    Kendra Gibson, OTR/L

## 2024-08-23 NOTE — CM/SW NOTE
08/23/24 1800   Discharge disposition   Expected discharge disposition Home or Self   Discharge transportation Private car     Pt discussed during nursing rounds. Pt is stable for dc today. MD dc order entered. Anticipated therapy need: Home with Home Healthcare. Pt declining home health at discharge. Pt's daughter will provide transport at dc.    Plan: Home w/daughter today.    / to remain available for support and/or discharge planning.     MELVINA Schwab    [phone]

## 2024-08-23 NOTE — CHRONIC PAIN
[facility]  Anesthesiology Pain Management Progress Note      Patient name: Petty Valdovinos 74 year old female  : 1950  MRN: [id number]    Diagnosis: [unfilled]    Reason for Consult: s/l lumbar surgicenter    Current hospital day: Hospital Day: 2    Pain Scores: 4    Current Medications:  Scheduled Meds:   sennosides  17.2 mg Oral Nightly    docusate sodium  100 mg Oral BID    morphINE ER  15 mg Oral 2 times per day    DULoxetine  30 mg Oral Daily    fenofibrate micronized  134 mg Oral Daily    gabapentin  400 mg Oral TID    levothyroxine  50 mcg Oral Before breakfast    lisinopril  5 mg Oral Daily    montelukast  10 mg Oral Daily    pravastatin  20 mg Oral Nightly    fluticasone-salmeterol  1 puff Inhalation BID    And    umeclidinium bromide  1 puff Inhalation Daily    insulin aspart  1-5 Units Subcutaneous TID CC     Continuous Infusions:   lactated ringers Stopped (24 0725)     PRN Meds:.  sodium chloride    polyethylene glycol (PEG 3350)    magnesium hydroxide    bisacodyl    fleet enema    ondansetron    metoclopramide    diphenhydrAMINE **OR** diphenhydrAMINE    benzocaine-menthol    oxyCODONE **OR** oxyCODONE    tiZANidine    diazePAM    morphINE    glucose **OR** glucose **OR** glucose-vitamin C **OR** dextrose **OR** glucose **OR** glucose **OR** glucose-vitamin C    zolpidem      Assessment:  Doing well    Plan:  Po meds    HUMBERTO PRETTY MD  2024  Anesthesia Chronic Pain Service [phone]

## 2024-08-23 NOTE — PHYSICAL THERAPY NOTE
PHYSICAL THERAPY EVALUATION - INPATIENT     Room Number: Room 1/Room 1-A  Evaluation Date: 8/23/2024  Type of Evaluation: Initial   Physician Order: PT Eval and Treat    Presenting Problem: Pt is s/p L3-L5 Laminectomy . Spine precautions post sx .  No brace order in chart .  PMH : Compression fx Nov 2023 with kyphoplasty --PE/ IR procedure Dec 2023/ ostomy bag  -- Recent loss of spouse in Jan 2023 .     Reason for Therapy: Mobility Dysfunction and Discharge Planning    PHYSICAL THERAPY ASSESSMENT   Patient is a 74 year old female admitted 8/22/2024 for Presenting Problem: Pt is s/p L3-L5 Laminectomy . Spine precautions post sx .  No brace order in chart .  PMH :Lumbar Compression fx Nov 2023 with kyphoplasty --PE/ IR procedure Dec 2023/ ostomy bag  -- Recent loss of spouse in Jan 2023 ..      Prior to admission, patient's baseline is Indep ADL , limited community ambulation for MD appt only with RW .  Indep household ambulation with RW.   Pt reports : \" I spent most of the day in bed due to all the pain I was in before the sx\".   Pt reports hx of falls.   Pt is alert and oriented.   Pt is weepy throughout session recalling for therapist PMH events, HPI and recent loss of spouse.   RN Informed /aware of pt reports and weepiness discussed depression screening.          Patient is currently functioning below baseline with bed mobility, transfers, gait, stair negotiation, standing prolonged periods, and performing household tasks.  Patient is requiring minimal assist as a result of the following impairments: decreased functional strength, decreased endurance/aerobic capacity, pain, impaired standing balance, decreased muscular endurance, medical status, and pt reports .  Pt rating pain at higher levels yet agreeable therapy participation.    Drain in place at sx site.   Physical Therapy will continue to follow for duration of hospitalization.    Patient will benefit from continued skilled PT Services at discharge to  promote prior level of function.    Anticipate patient will return home with 24hr care of family recommended .  Per pt dtr will be staying with pt providing the recommended 24hrcare .  Pt has a RW and all equipment  . Pt declined need for HH PT at this time when discussed role of HH PT .   Pt will follow up at [place] with Kevin CARY--therapy recommending OP PT when approved by kevin CARY team.     PLAN  PT Treatment Plan: Bed mobility;Body mechanics;Endurance;Energy conservation;Patient education;Family education;Gait training;Balance training;Transfer training;Stair training;Stoop training  Rehab Potential : Good  Frequency (Obs): Daily    PHYSICAL THERAPY MEDICAL/SOCIAL HISTORY   History related to current admission: ASSESSMENT/PLAN:     S/P L3-5 laminectomy      1) Continue current pain management protocol for now with pain management consulted.   2) Plan to discharge home pending clearance from the medicine/hospitalist team and PT/OT, pain management and hemovac output-will recheck at 1400 today.   3) Post operative medications were sent to patient's pharmacy outside of [facility]   4) Plan to have patient remove their dressing on POD#3  5) Follow up in clinic in 2 to 3 weeks, appointment should already be scheduled, please have patient call [phone] to confirm or change date/location.   6) All questions answered by the bedside today      Tahmina Paulino PA-C  8/23/2024  7:09 AM      Problem List  Principal Problem:    Lumbar spinal stenosis  Active Problems:    Hx of pulmonary embolus    CKD stage 3a, GFR 45-59 ml/min (HCC)    Type 2 diabetes mellitus with hyperglycemia (HCC)    Essential hypertension    High cholesterol    Chronic anticoagulation    S/P lumbar spinal fusion      HOME SITUATION  Home Situation  Type of Home: House  Home Layout: One level  Stairs to Enter : 3  Railing: No  Lives With: Daughter;Son (son home during day / dtr works)  Drives: No  Patient Owned Equipment: Rolling walker;Wheelchair (shower chair    walk in)  Patient Regularly Uses:  (Home bound ambulator with RW --limited community for MD garzat.   Indep ADL --hx of multiple falls---BSC)         SUBJECTIVE  \" I have been through a lot ---do you want to see a picture on my phone of the large saddle PE ---I almost  \"       \" my spouse  in 2023 \"     \" I have been in bed most of the time due to the pain I have been in for awhile now \"     PHYSICAL THERAPY EXAMINATION   OBJECTIVE  Precautions: Spine  Fall Risk: High fall risk    WEIGHT BEARING RESTRICTION  Weight Bearing Restriction: None                PAIN ASSESSMENT  Ratin  Location: Both legs --\"both legs,its my whole leg--- myleft used to be worse than right now its both \"  9/10  pain       chronic  LE pain prior to sx : \" I was in bed all the time due to pain in my legs before the sx \"   --- sx site  5/10  Management Techniques: Activity promotion;Body mechanics;Breathing techniques;Relaxation;Repositioning    COGNITION  Overall Cognitive Status:  WFL - within functional limits    RANGE OF MOTION AND STRENGTH ASSESSMENT  Upper extremity ROM and strength are within functional limits   Lower extremity ROM is within functional limits     Pt presents with trunk and  B generalized LE weakness per pt left > right present prior to sx . Decreased strength noted by need for RW use, assist with fxn mobility , decrease posture and gait quality .  Pt with intact and symmetrical grossly WFL B ankle DF and PF           BALANCE  Static Sitting: Fair  Dynamic Sitting: Not tested  Static Standing: Fair -  Dynamic Standing: Fair -    NEUROLOGICAL FINDINGS         Denies numbness or tingling   pre sx B LE weakness and trunk per pt              ACTIVITY TOLERANCE  Pulse: 102 (resting   108 activity)        BP: 148/88 (resting   after activity  135/55)             O2 WALK  Oxygen Therapy  SPO2% on Room Air at Rest: 94  SPO2% Ambulation on Room Air: 98    AM-PAC '6-Clicks' INPATIENT SHORT FORM - BASIC MOBILITY  How  much difficulty does the patient currently have...  Patient Difficulty: Turning over in bed (including adjusting bedclothes, sheets and blankets)?: A Little   Patient Difficulty: Sitting down on and standing up from a chair with arms (e.g., wheelchair, bedside commode, etc.): A Little   Patient Difficulty: Moving from lying on back to sitting on the side of the bed?: A Little   How much help from another person does the patient currently need...   Help from Another: Moving to and from a bed to a chair (including a wheelchair)?: A Little   Help from Another: Need to walk in hospital room?: A Little   Help from Another: Climbing 3-5 steps with a railing?: A Little     AM-PAC Score:  Raw Score: 18   Approx Degree of Impairment: 46.58%   Standardized Score (AM-PAC Scale): 43.63   CMS Modifier (G-Code): CK    FUNCTIONAL ABILITY STATUS  Functional Mobility/Gait Assessment  Gait Assistance: Minimum assistance;Contact guard assist  Distance (ft): 200 ft x 1  Assistive Device: Rolling walker  Pattern: R Steppage;L Steppage;R Foot flat;L Foot flat (flexed posture  cues needed)  Rolling: contact guard assist log roll sequencing and spine sparing sequencing fxn mobility   reinforced   Supine to Sit: minimal assist  Sit to Supine: minimal assist  Sit to Stand: contact guard assist    Up down 4 steps with B rails min assist cues provided , Pt also able to up down curb steps with RW min assist . Education provided on proper sequencing and safety .  Pt once in home lives on main level.     Patient received supine in bed, agreeable to physical therapy. Vital signs monitored as noted above, no adverse symptoms and patient stable during session.     Education with pt provided verbally, via demonstration, on written handout, and while practicing during session on Spine precautions, Physical therapy plan of care, physiological benefits of out of bed mobility, and fall risk prevention, post op spine sx therapy pt education , B AP, fxn  mobility training and DC Planning  .   The patient's Approx Degree of Impairment: 46.58% has been calculated based on documentation in the [facility] '6 clicks' Inpatient Basic Mobility Short Form.  Research supports that patients with this level of impairment may benefit from home with HH PT.Anticipate patient will return home with 24hr care of family recommended .  Per pt dtr will be staying with pt providing the recommended 24hrcare .  Pt has a RW and all equipment  . Pt declined need for HH PT at this time when discussed role of HH PT .   Pt will follow up at [place] with Kevin CARY--therapy recommending OP PT when approved by kevin CARY team. At end of session pt denies any questions verbalized understanding of therapy education .   Final disposition will be made by interdisciplinary medical team.    Patient End of Session: In bed;Needs met;Call light within reach;RN aware of session/findings;All patient questions and concerns addressed;Ice applied    CURRENT GOALS  Goals to be met by: 9/15/24  Patient Goal Patient's self-stated goal is: home with family    Goal #1 Patient is able to demonstrate supine - sit EOB @ level: CGA     Goal #1   Current Status    Goal #2 Patient is able to demonstrate transfers EOB to/from Chair/Wheelchair at assistance level: CGA with walker - rolling     Goal #2  Current Status    Goal #3 Patient is able to ambulate 200  feet with assist device: walker - rolling at assistance level: CGA   Goal #3   Current Status    Goal #4 Patient will negotiate 3 stairs/one curb w/ assistive device min assist    Goal #4   Current Status    Goal #5 Patient to demonstrate proper spine sparing sequencing with all fxn mobility .   Goal #5   Current Status    Goal #6    Goal #6  Current Status      Patient Evaluation Complexity Level:  History Moderate - 1 or 2 personal factors and/or co-morbidities   Examination of body systems Low -  addressing 1-2 elements   Clinical Presentation Low- Stable   Clinical Decision Making   Low Complexity   PT eval and therapy activity 2 units

## 2024-08-23 NOTE — DISCHARGE INSTRUCTIONS
DISCHARGE INSTRUCTIONS  Dr. Bay Krishna M.D.  Lumbar Laminectomy / Foraminotomy /   Microdiscectomy / Hemilaminotomy  Post-Operative Instructions - Lumbar     Wound Care   Do not change or remove your initial postoperative dressing for the first 2 days after surgery unless   instructed to do so by Dr. Krishna's staff or if it is saturated by drainage. If removed, please reapply a clean  dry dressing over the incision. You may have thin adhesive paper strips on your incision after surgery--  please do NOT remove them. They will be removed at your 2-week postoperative appointment. You   may remove your dressing and shower on the third day after surgery. Let warm soapy water run over   the incisional area. Do not scrub the area. Gently pat the incisional area completely dry. You may   reapply a clean dry dressing over the incision to prevent irritation from clothing, but this is not required   after post op day 3. You are not allowed to soak your incision in a bathtub, hot tub, or swimming pool   until the incision is completely healed and Dr. Krishna or his staff permit these activities. Do not apply   antibiotic gels, ointments (Neosporin or bacitracin), lotions, peroxide or iodine solutions on or around the   incision.  You may have some swelling and/or clear yellow drainage around your incisional site. This is normal and   may take several weeks to go away. Please leave any superficial scabs alone and let them fall off on their   Own.    Immediate Follow Up   If you notice incisional redness or increased swelling, continuous clear drainage or change in color to the   drainage, malodor, significant leg swelling, increased pain and/or a fever greater than 101.5, you should   call our office. If it is after business hours you should present to the closest emergency room. If you   have shortness of breath, chest pain, significant stomach pain and bloating without a bowel movement,   complete loss of bowel or bladder  function please contact our office and present to your local emergency   room as soon as possible.    After surgery you may experience pain in or around the region of the incision. Some buttock and leg pain   as well as tingling or numbness may also be present. Initially it may be of greater intensity than before   surgery but will usually subside over time as the healing process occurs. This discomfort is caused from   surgical retraction of tissue as well as inflammation and swelling of previously compressed nerves.  Early activity after surgery is extremely important to help prevent the complications such as pneumonia   and blood clots. Activity also promotes recovery, relieves muscle stiffness, allows for development of a   well-organized scar, and improves your outcome. Your recovery is an essential part of the surgical   process so please follow the guidelines below to return to your desired activities as safely as possible.      Week 1   Try to get at least 8 hours of sleep each night. A disrupted sleep pattern is common after   discharge from the hospital and will return to normal over time.   Avoid bending and twisting at the waist. No bending more than what is required to get dressed.   No twisting more than required to toilet (wipe yourself).  No sitting for more than 1 hour at a time. Walk and/or change position before returning to a   sitting position.   You may not drive, but you may be driven.  Begin a daily walking program with 1-2 blocks initially; schedule a daily time and increase distance   daily. Ideally, we would like you to be up and walking 15 minutes/hour.   Eat a balanced high-fiber diet and drink plenty of water.  Take medications as prescribed, using narcotics and muscle relaxants only as needed.   Please ask Dr. Krishna or his staff when it is permissible to restart any NSAIDs such as Advil, Motrin,   Aleve, ibuprofen, naproxen, diclofenac, meloxicam or celecoxib.   Take stool softeners to  help with bowel movements.    Week 2   Resume normal rising and retiring schedule but continue to rest throughout the day as needed.  You may not drive, but you may be driven.  No lifting of anything weighing more than 10 to 15 pounds.  Continue scheduled walking, increasing distance and frequency as tolerated.  May resume sexual relations when comfortable between 2 to 4 weeks after surgery.  Begin weaning from narcotic pain medications if you have not already.  Follow-up in the office as scheduled for further instructions.     Disability   The usual period of recovery for laminectomy, foraminotomy, microdiscectomy or hemilaminotomy is 4   to 6 weeks. Complete healing however may take up to 3 to 6 months. Some patients may return to work   sooner than others depending on their type of job, response to surgery and ability to perform other   lighter tasks in the workplace. Physician approval is required prior to returning to work.    Post-Operative Pain Medication Policy   It is Dr. Krishna's aim to make you as comfortable as possible after surgery. We realize pain management   is not perfect, and that you will have some discomfort after your operation. There are several factors   that limit our ability to completely eliminate pain after surgery. This first is that pain medications have   side effects. Please be advised that high doses or continued use of narcotic medications may put you at   risk for serious health issues including but not limited to respiratory depression (decreased ability to  breathe normally), hypotension (low blood pressure), nausea and constipation, generalized itching,   urinary retention (inability to urinate) and abdominal distention.   Dr. Krishna will prescribe pain medication for 2-3 weeks after surgery and may refer you out to a pain    for any narcotic medication requested after this period of time.    Preventing Opioid Addiction   [facility] is  committed to protecting our patients from Opioid addiction. We only   prescribe opioids when necessary. Whenever possible, we use less-addictive pain medication and   alternative pain management options. Both high-dosage opioids and low-dosage opioids taken over long   periods of time can increase the risk of addiction. We attempt to limit our prescriptions of opioids after   surgery as much as possible, which may include lower dosages of opioid medications or fewer pills. If you   have additional questions about Opioids and their dependency, please contact our clinic.    Contact Information   Please contact Dr. Tomi Delgadillo’s , at [phone] with any questions   or concerns pertaining to scheduling or other administrative issues.  Please contact Tahmina Paulino PA-C, Dr. Krishna’s physician assistant, at [phone] with any medical   questions or concerns.     AFTER HOURS EMERGENCY CONTACT: Please dial [phone] and press “0” for the    to connect you to the orthopaedic fellow or resident on call if you have any urgent questions or   concerns after regular business hours. If you do not hear back from the on-call physician in a timely   matter and your urgent matter turns emergent, you should present to your local emergency room.  Please follow-up with Tahmina Paulino PA-C the following business day with an update if you do have to   contact the on-call physician or present to your local emergency room after surgery.

## 2024-08-26 NOTE — PAYOR COMM NOTE
--------------  DISCHARGE REVIEW    Payor: [employer]  Subscriber #:  [id number]  Authorization Number: [id number]    Admit date: 8/22/24  Admit time:   8:54 AM  Discharge Date: 8/23/2024  7:02 PM     Admitting Physician: Bay Krishna MD  Attending Physician:  No att. providers found  Primary Care Physician: Blanka Grant MD       REVIEWER COMMENTS    DATE OF SURGERY  8/22/24     SURGEON   Bay Krishna MD     ASSISTANT  ELO Dobson     PREOPERATIVE DIAGNOSIS   L3-L5 spondylosis  L3-L5 spinal stenosis  History of L4 compression fracture     POSTOPERATIVE DIAGNOSIS   Same     PROCEDURES   1. L3-L5 laminectomy under direct visualization.   2. L3-L5 lumbar fusion  3. Use of fluoroscopy.   4. Use of intra-operative microscope.     DESCRIPTION OF PROCEDURE   Bilateral thecal sac decompression was performed. Exiting nerve roots were identified.

## 2025-06-15 ENCOUNTER — APPOINTMENT (OUTPATIENT)
Dept: GENERAL RADIOLOGY | Age: 75
End: 2025-06-15
Attending: STUDENT IN AN ORGANIZED HEALTH CARE EDUCATION/TRAINING PROGRAM
Payer: MEDICARE

## 2025-06-15 ENCOUNTER — HOSPITAL ENCOUNTER (EMERGENCY)
Age: 75
Discharge: HOME | End: 2025-06-15
Attending: STUDENT IN AN ORGANIZED HEALTH CARE EDUCATION/TRAINING PROGRAM
Payer: MEDICARE

## 2025-06-15 VITALS
DIASTOLIC BLOOD PRESSURE: 91 MMHG | SYSTOLIC BLOOD PRESSURE: 158 MMHG | WEIGHT: 170 LBS | OXYGEN SATURATION: 97 % | HEART RATE: 91 BPM | RESPIRATION RATE: 22 BRPM | HEIGHT: 63 IN | BODY MASS INDEX: 30.12 KG/M2 | TEMPERATURE: 101 F

## 2025-06-15 DIAGNOSIS — J45.901 MILD ASTHMA WITH EXACERBATION, UNSPECIFIED WHETHER PERSISTENT (HCC): ICD-10-CM

## 2025-06-15 DIAGNOSIS — J06.9 VIRAL UPPER RESPIRATORY INFECTION: Primary | ICD-10-CM

## 2025-06-15 LAB
ANION GAP SERPL CALC-SCNC: 7 MMOL/L (ref 0–18)
BASOPHILS # BLD AUTO: 0.04 X10(3) UL (ref 0–0.2)
BASOPHILS NFR BLD AUTO: 0.5 %
BUN BLD-MCNC: 19 MG/DL (ref 9–23)
CALCIUM BLD-MCNC: 10.2 MG/DL (ref 8.7–10.6)
CHLORIDE SERPL-SCNC: 105 MMOL/L (ref 98–112)
CO2 SERPL-SCNC: 23 MMOL/L (ref 21–32)
CREAT BLD-MCNC: 1.05 MG/DL (ref 0.55–1.02)
EGFRCR SERPLBLD CKD-EPI 2021: 56 ML/MIN/1.73M2 (ref 60–?)
EOSINOPHIL # BLD AUTO: 0.09 X10(3) UL (ref 0–0.7)
EOSINOPHIL NFR BLD AUTO: 1.1 %
ERYTHROCYTE [DISTWIDTH] IN BLOOD BY AUTOMATED COUNT: 13.6 %
GLUCOSE BLD-MCNC: 119 MG/DL (ref 70–99)
HCT VFR BLD AUTO: 41 % (ref 35–48)
HGB BLD-MCNC: 13.9 G/DL (ref 12–16)
IMM GRANULOCYTES # BLD AUTO: 0.04 X10(3) UL (ref 0–1)
IMM GRANULOCYTES NFR BLD: 0.5 %
LYMPHOCYTES # BLD AUTO: 1.21 X10(3) UL (ref 1–4)
LYMPHOCYTES NFR BLD AUTO: 15.4 %
MCH RBC QN AUTO: 30.5 PG (ref 26–34)
MCHC RBC AUTO-ENTMCNC: 33.9 G/DL (ref 31–37)
MCV RBC AUTO: 90.1 FL (ref 80–100)
MONOCYTES # BLD AUTO: 0.77 X10(3) UL (ref 0.1–1)
MONOCYTES NFR BLD AUTO: 9.8 %
NEUTROPHILS # BLD AUTO: 5.7 X10 (3) UL (ref 1.5–7.7)
NEUTROPHILS # BLD AUTO: 5.7 X10(3) UL (ref 1.5–7.7)
NEUTROPHILS NFR BLD AUTO: 72.7 %
OSMOLALITY SERPL CALC.SUM OF ELEC: 283 MOSM/KG (ref 275–295)
PLATELET # BLD AUTO: 266 10(3)UL (ref 150–450)
POCT INFLUENZA A: NEGATIVE
POCT INFLUENZA B: NEGATIVE
POTASSIUM SERPL-SCNC: 4.1 MMOL/L (ref 3.5–5.1)
RBC # BLD AUTO: 4.55 X10(6)UL (ref 3.8–5.3)
SARS-COV-2 RNA RESP QL NAA+PROBE: NOT DETECTED
SODIUM SERPL-SCNC: 135 MMOL/L (ref 136–145)
WBC # BLD AUTO: 7.9 X10(3) UL (ref 4–11)

## 2025-06-15 PROCEDURE — 99284 EMERGENCY DEPT VISIT MOD MDM: CPT

## 2025-06-15 PROCEDURE — 71045 X-RAY EXAM CHEST 1 VIEW: CPT | Performed by: STUDENT IN AN ORGANIZED HEALTH CARE EDUCATION/TRAINING PROGRAM

## 2025-06-15 PROCEDURE — 87502 INFLUENZA DNA AMP PROBE: CPT | Performed by: STUDENT IN AN ORGANIZED HEALTH CARE EDUCATION/TRAINING PROGRAM

## 2025-06-15 PROCEDURE — 80048 BASIC METABOLIC PNL TOTAL CA: CPT | Performed by: STUDENT IN AN ORGANIZED HEALTH CARE EDUCATION/TRAINING PROGRAM

## 2025-06-15 PROCEDURE — 94640 AIRWAY INHALATION TREATMENT: CPT

## 2025-06-15 PROCEDURE — 96374 THER/PROPH/DIAG INJ IV PUSH: CPT

## 2025-06-15 PROCEDURE — 85025 COMPLETE CBC W/AUTO DIFF WBC: CPT | Performed by: STUDENT IN AN ORGANIZED HEALTH CARE EDUCATION/TRAINING PROGRAM

## 2025-06-15 RX ORDER — METHYLPREDNISOLONE SODIUM SUCCINATE 125 MG/2ML
125 INJECTION INTRAMUSCULAR; INTRAVENOUS ONCE
Status: COMPLETED | OUTPATIENT
Start: 2025-06-15 | End: 2025-06-15

## 2025-06-15 RX ORDER — PREDNISONE 20 MG/1
40 TABLET ORAL DAILY
Qty: 8 TABLET | Refills: 0 | Status: SHIPPED | OUTPATIENT
Start: 2025-06-15 | End: 2025-06-20 | Stop reason: CLARIF

## 2025-06-15 RX ORDER — IPRATROPIUM BROMIDE AND ALBUTEROL SULFATE 2.5; .5 MG/3ML; MG/3ML
3 SOLUTION RESPIRATORY (INHALATION) ONCE
Status: COMPLETED | OUTPATIENT
Start: 2025-06-15 | End: 2025-06-15

## 2025-06-15 RX ORDER — AMOXICILLIN 250 MG/1
250 CAPSULE ORAL DAILY
COMMUNITY
Start: 2025-04-16 | End: 2025-06-21

## 2025-06-15 RX ORDER — ALBUTEROL SULFATE 0.83 MG/ML
2.5 SOLUTION RESPIRATORY (INHALATION) EVERY 4 HOURS PRN
Qty: 30 EACH | Refills: 0 | Status: SHIPPED | OUTPATIENT
Start: 2025-06-15 | End: 2025-07-15

## 2025-06-15 RX ORDER — ACETAMINOPHEN 500 MG
1000 TABLET ORAL ONCE
Status: COMPLETED | OUTPATIENT
Start: 2025-06-15 | End: 2025-06-15

## 2025-06-15 NOTE — ED PROVIDER NOTES
History     Chief Complaint   Patient presents with    Difficulty Breathing       HPI    74 year old female with history of asthma presents with 2 days of cough, congestion, rhinorrhea, wheezing and back pain.  Noted to have fever here in the ER.  She has been using her albuterol inhaler but she did not have nebulizer solution.  She is compliant with Trelegy.  Patient is currently on amoxicillin for urinary tract infection.  Patient is on Eliquis for prior VTE        Past Medical History[1]    Past Surgical History[2]    Social History     Socioeconomic History    Marital status:    Tobacco Use    Smoking status: Never     Passive exposure: Never    Smokeless tobacco: Never   Vaping Use    Vaping status: Never Used   Substance and Sexual Activity    Alcohol use: Never    Drug use: Never     Social Drivers of Health     Food Insecurity: No Food Insecurity (8/22/2024)    Food Insecurity     Food Insecurity: Never true   Transportation Needs: No Transportation Needs (8/22/2024)    Transportation Needs     Lack of Transportation: No   Housing Stability: Low Risk  (8/22/2024)    Housing Stability     Housing Instability: No                   Physical Exam     ED Triage Vitals [06/15/25 1823]   /86   Pulse 104   Resp 24   Temp (!) 100.5 °F (38.1 °C)   Temp src    SpO2 98 %   O2 Device None (Room air)       Physical Exam  Constitutional:       General: She is in acute distress.   HENT:      Nose: Congestion present.   Eyes:      Extraocular Movements: Extraocular movements intact.   Cardiovascular:      Rate and Rhythm: Normal rate.      Pulses: Normal pulses.   Pulmonary:      Effort: Respiratory distress present.      Breath sounds: Wheezing present.   Musculoskeletal:      Cervical back: Normal range of motion.      Comments: No midline spinal tenderness   Neurological:      General: No focal deficit present.      Mental Status: She is alert.              ED Course     Labs Reviewed   BASIC METABOLIC  PANEL (8) - Abnormal; Notable for the following components:       Result Value    Glucose 119 (*)     Sodium 135 (*)     Creatinine 1.05 (*)     eGFR-Cr 56 (*)     All other components within normal limits   RAPID SARS-COV-2 BY PCR - Normal   POCT FLU TEST - Normal    Narrative:     This assay is a rapid molecular in vitro test utilizing nucleic acid amplification of influenza A and B viral RNA.   CBC WITH DIFFERENTIAL WITH PLATELET   RAINBOW DRAW LAVENDER   RAINBOW DRAW LIGHT GREEN   RAINBOW DRAW BLUE     XR CHEST AP PORTABLE  (CPT=71045)  Result Date: 6/15/2025  CONCLUSION:  Normal cardiac and mediastinal contours.  No pulmonary edema or focal airspace consolidation.  The pleural spaces are clear.   LOCATION:  [facility]      Dictated by (CST): Jaylen Gastelum MD on 6/15/2025 at 7:03 PM     Finalized by (CST): Jaylen Gastelum MD on 6/15/2025 at 7:04 PM             MDM     Vitals:    06/15/25 1823 06/15/25 1924   BP: 144/86 (!) 158/91   Pulse: 104 91   Resp: 24 22   Temp: (!) 100.5 °F (38.1 °C)    SpO2: 98% 97%   Weight: 77.1 kg    Height: 160 cm (5' 3\")        Viral upper respiratory infection, bronchitis, pneumonia triggering asthma exacerbation likely.  Oxygen is normal on room air.  Will give nebulization, steroids  Given patient is on baseline antibiotics and anticoagulation much less likely bacterial infection/VTE.    ED Course as of 06/15/25 1933  ------------------------------------------------------------  Time: 06/15 1918  Comment: Labs without leukocytosis, reassuring renal function.  No acidosis.  ------------------------------------------------------------  Time: 06/15 1918  Comment: CXR interpretation by me with no concerning acute findings    ------------------------------------------------------------  Time: 06/15 1931  Comment: On reassessment patient is feeling much improved, she has minimal expiratory wheezes, moving air quite well and oxygen remains normal on room air.  Feels back to her baseline.   Discussed continue supportive measures, saline rinse, will refill patient's nebulizer solution and steroid burst.         Disposition and Plan     Clinical Impression:  1. Viral upper respiratory infection    2. Mild asthma with exacerbation, unspecified whether persistent (HCC)        Disposition:  Discharge    Follow-up:  Blanka Grant MD  [address]  [facility] [address] [phone]    Follow up        Medications Prescribed:  Current Discharge Medication List        START taking these medications    Details   predniSONE 20 MG Oral Tab Take 2 tablets (40 mg total) by mouth daily for 4 days.  Qty: 8 tablet, Refills: 0      albuterol (2.5 MG/3ML) 0.083% Inhalation Nebu Soln Take 3 mL (2.5 mg total) by nebulization every 4 (four) hours as needed for Wheezing or Shortness of Breath.  Qty: 30 each, Refills: 0                      [1]   Past Medical History:   Anemia    Anxiety and depression    Anxiety state    Asthma (HCC)    Back problem    Calculus of kidney    Cataract    Colitis    ulcerative colitis    Colostomy present (HCC)    ileostomy in place    Deep vein thrombosis (HCC)    Depression    Diabetes (HCC)    Type 2    Disorder of thyroid    High blood pressure    High cholesterol    History of blood transfusion    after colectomy    HTN (hypertension)    Hyperlipidemia    Hypothyroidism    Kidney stones    Macular degeneration    Osteoarthritis    Osteoporosis    Pneumonia    Pulmonary embolism (HCC)    saddle- possibly due to immobility/fall    Pyelonephritis    Spinal stenosis    lumbar    Stage 3a chronic kidney disease (CKD) (HCC)    Visual impairment    readers   [2]   Past Surgical History:  Procedure Laterality Date    Colectomy      Colon surgery      ileostomy bag right side of abdomen    Ir ivc filter placement Right 07/30/2024    Lumbar spine surgery      kyphoplasty    Other surgical history      ureteric stent- due to kidney stones    Other surgical history  12/2023    saddle  pulmonary embolism removed    Parathyroidectomy

## 2025-06-15 NOTE — ED INITIAL ASSESSMENT (HPI)
Pt with asthma exac and cough, pt had back surg in August 2024. C/o in the past messing up her back from coughing so hard. Hx saddle pe

## 2025-06-20 ENCOUNTER — APPOINTMENT (OUTPATIENT)
Dept: GENERAL RADIOLOGY | Facility: HOSPITAL | Age: 75
End: 2025-06-20
Attending: EMERGENCY MEDICINE
Payer: MEDICARE

## 2025-06-20 ENCOUNTER — APPOINTMENT (OUTPATIENT)
Dept: CT IMAGING | Facility: HOSPITAL | Age: 75
End: 2025-06-20
Attending: EMERGENCY MEDICINE
Payer: MEDICARE

## 2025-06-20 ENCOUNTER — HOSPITAL ENCOUNTER (OUTPATIENT)
Facility: HOSPITAL | Age: 75
Setting detail: OBSERVATION
Discharge: HOME | End: 2025-06-21
Attending: EMERGENCY MEDICINE | Admitting: HOSPITALIST
Payer: MEDICARE

## 2025-06-20 DIAGNOSIS — R93.89 ABNORMAL CHEST X-RAY: ICD-10-CM

## 2025-06-20 DIAGNOSIS — M54.59 INTRACTABLE LOW BACK PAIN: Primary | ICD-10-CM

## 2025-06-20 PROBLEM — R52 INTRACTABLE PAIN: Status: ACTIVE | Noted: 2025-06-20

## 2025-06-20 LAB
ALBUMIN SERPL-MCNC: 4.7 G/DL (ref 3.2–4.8)
ALBUMIN/GLOB SERPL: 2 {RATIO} (ref 1–2)
ALP LIVER SERPL-CCNC: 69 U/L (ref 55–142)
ALT SERPL-CCNC: 33 U/L (ref 10–49)
ANION GAP SERPL CALC-SCNC: 10 MMOL/L (ref 0–18)
AST SERPL-CCNC: 28 U/L (ref ?–34)
BASOPHILS # BLD AUTO: 0.09 X10(3) UL (ref 0–0.2)
BASOPHILS NFR BLD AUTO: 0.8 %
BILIRUB SERPL-MCNC: 0.6 MG/DL (ref 0.2–1.1)
BUN BLD-MCNC: 25 MG/DL (ref 9–23)
CALCIUM BLD-MCNC: 9.7 MG/DL (ref 8.7–10.6)
CHLORIDE SERPL-SCNC: 101 MMOL/L (ref 98–112)
CO2 SERPL-SCNC: 24 MMOL/L (ref 21–32)
CREAT BLD-MCNC: 1.14 MG/DL (ref 0.55–1.02)
EGFRCR SERPLBLD CKD-EPI 2021: 51 ML/MIN/1.73M2 (ref 60–?)
EOSINOPHIL # BLD AUTO: 0.2 X10(3) UL (ref 0–0.7)
EOSINOPHIL NFR BLD AUTO: 1.9 %
ERYTHROCYTE [DISTWIDTH] IN BLOOD BY AUTOMATED COUNT: 13.2 %
EST. AVERAGE GLUCOSE BLD GHB EST-MCNC: 123 MG/DL (ref 68–126)
GLOBULIN PLAS-MCNC: 2.3 G/DL (ref 2–3.5)
GLUCOSE BLD-MCNC: 122 MG/DL (ref 70–99)
GLUCOSE BLD-MCNC: 90 MG/DL (ref 70–99)
HBA1C MFR BLD: 5.9 % (ref ?–5.7)
HCT VFR BLD AUTO: 39.5 % (ref 35–48)
HGB BLD-MCNC: 13.6 G/DL (ref 12–16)
IMM GRANULOCYTES # BLD AUTO: 0.29 X10(3) UL (ref 0–1)
IMM GRANULOCYTES NFR BLD: 2.7 %
LYMPHOCYTES # BLD AUTO: 3.2 X10(3) UL (ref 1–4)
LYMPHOCYTES NFR BLD AUTO: 29.6 %
MCH RBC QN AUTO: 30 PG (ref 26–34)
MCHC RBC AUTO-ENTMCNC: 34.4 G/DL (ref 31–37)
MCV RBC AUTO: 87.2 FL (ref 80–100)
MONOCYTES # BLD AUTO: 0.85 X10(3) UL (ref 0.1–1)
MONOCYTES NFR BLD AUTO: 7.9 %
NEUTROPHILS # BLD AUTO: 6.18 X10 (3) UL (ref 1.5–7.7)
NEUTROPHILS # BLD AUTO: 6.18 X10(3) UL (ref 1.5–7.7)
NEUTROPHILS NFR BLD AUTO: 57.1 %
OSMOLALITY SERPL CALC.SUM OF ELEC: 286 MOSM/KG (ref 275–295)
PLATELET # BLD AUTO: 344 10(3)UL (ref 150–450)
POTASSIUM SERPL-SCNC: 4.3 MMOL/L (ref 3.5–5.1)
PROT SERPL-MCNC: 7 G/DL (ref 5.7–8.2)
RBC # BLD AUTO: 4.53 X10(6)UL (ref 3.8–5.3)
SODIUM SERPL-SCNC: 135 MMOL/L (ref 136–145)
WBC # BLD AUTO: 10.8 X10(3) UL (ref 4–11)

## 2025-06-20 PROCEDURE — 99221 1ST HOSP IP/OBS SF/LOW 40: CPT | Performed by: NEUROLOGICAL SURGERY

## 2025-06-20 PROCEDURE — 71260 CT THORAX DX C+: CPT | Performed by: EMERGENCY MEDICINE

## 2025-06-20 PROCEDURE — 72110 X-RAY EXAM L-2 SPINE 4/>VWS: CPT | Performed by: EMERGENCY MEDICINE

## 2025-06-20 PROCEDURE — 71046 X-RAY EXAM CHEST 2 VIEWS: CPT | Performed by: EMERGENCY MEDICINE

## 2025-06-20 PROCEDURE — 99223 1ST HOSP IP/OBS HIGH 75: CPT | Performed by: HOSPITALIST

## 2025-06-20 RX ORDER — LISINOPRIL 5 MG/1
5 TABLET ORAL DAILY
Status: DISCONTINUED | OUTPATIENT
Start: 2025-06-21 | End: 2025-06-21

## 2025-06-20 RX ORDER — HYDROCODONE BITARTRATE AND ACETAMINOPHEN 5; 325 MG/1; MG/1
1 TABLET ORAL EVERY 4 HOURS PRN
Status: DISCONTINUED | OUTPATIENT
Start: 2025-06-20 | End: 2025-06-21

## 2025-06-20 RX ORDER — ALBUTEROL SULFATE 0.83 MG/ML
2.5 SOLUTION RESPIRATORY (INHALATION) EVERY 4 HOURS PRN
Status: DISCONTINUED | OUTPATIENT
Start: 2025-06-20 | End: 2025-06-21

## 2025-06-20 RX ORDER — FENOFIBRATE 134 MG/1
134 CAPSULE ORAL NIGHTLY
Status: DISCONTINUED | OUTPATIENT
Start: 2025-06-20 | End: 2025-06-21

## 2025-06-20 RX ORDER — FERROUS SULFATE 325(65) MG
325 TABLET, DELAYED RELEASE (ENTERIC COATED) ORAL
Status: DISCONTINUED | OUTPATIENT
Start: 2025-06-23 | End: 2025-06-21

## 2025-06-20 RX ORDER — FLUTICASONE PROPIONATE AND SALMETEROL 500; 50 UG/1; UG/1
1 POWDER RESPIRATORY (INHALATION) 2 TIMES DAILY
Status: DISCONTINUED | OUTPATIENT
Start: 2025-06-20 | End: 2025-06-21

## 2025-06-20 RX ORDER — HYDROMORPHONE HYDROCHLORIDE 1 MG/ML
1 INJECTION, SOLUTION INTRAMUSCULAR; INTRAVENOUS; SUBCUTANEOUS ONCE
Refills: 0 | Status: COMPLETED | OUTPATIENT
Start: 2025-06-20 | End: 2025-06-20

## 2025-06-20 RX ORDER — SENNOSIDES 8.6 MG
17.2 TABLET ORAL NIGHTLY PRN
Status: DISCONTINUED | OUTPATIENT
Start: 2025-06-20 | End: 2025-06-21

## 2025-06-20 RX ORDER — ECHINACEA PURPUREA EXTRACT 125 MG
1 TABLET ORAL
Status: DISCONTINUED | OUTPATIENT
Start: 2025-06-20 | End: 2025-06-21

## 2025-06-20 RX ORDER — LEVOTHYROXINE SODIUM 50 UG/1
50 TABLET ORAL
Status: DISCONTINUED | OUTPATIENT
Start: 2025-06-21 | End: 2025-06-21

## 2025-06-20 RX ORDER — MORPHINE SULFATE 4 MG/ML
4 INJECTION, SOLUTION INTRAMUSCULAR; INTRAVENOUS ONCE
Status: COMPLETED | OUTPATIENT
Start: 2025-06-20 | End: 2025-06-20

## 2025-06-20 RX ORDER — GUAIFENESIN 600 MG/1
1200 TABLET, EXTENDED RELEASE ORAL 2 TIMES DAILY
Status: DISCONTINUED | OUTPATIENT
Start: 2025-06-20 | End: 2025-06-21

## 2025-06-20 RX ORDER — HYDROCODONE BITARTRATE AND HOMATROPINE METHYLBROMIDE ORAL SOLUTION 5; 1.5 MG/5ML; MG/5ML
5 LIQUID ORAL EVERY 4 HOURS PRN
Status: DISCONTINUED | OUTPATIENT
Start: 2025-06-20 | End: 2025-06-21

## 2025-06-20 RX ORDER — BENZONATATE 100 MG/1
200 CAPSULE ORAL 3 TIMES DAILY
Status: DISCONTINUED | OUTPATIENT
Start: 2025-06-20 | End: 2025-06-21

## 2025-06-20 RX ORDER — MORPHINE SULFATE 2 MG/ML
2 INJECTION, SOLUTION INTRAMUSCULAR; INTRAVENOUS EVERY 2 HOUR PRN
Status: DISCONTINUED | OUTPATIENT
Start: 2025-06-20 | End: 2025-06-21

## 2025-06-20 RX ORDER — MORPHINE SULFATE 2 MG/ML
1 INJECTION, SOLUTION INTRAMUSCULAR; INTRAVENOUS EVERY 2 HOUR PRN
Status: DISCONTINUED | OUTPATIENT
Start: 2025-06-20 | End: 2025-06-21

## 2025-06-20 RX ORDER — METHOCARBAMOL 500 MG/1
500 TABLET, FILM COATED ORAL 3 TIMES DAILY
Status: DISCONTINUED | OUTPATIENT
Start: 2025-06-20 | End: 2025-06-21

## 2025-06-20 RX ORDER — GABAPENTIN 400 MG/1
400 CAPSULE ORAL 3 TIMES DAILY
Status: DISCONTINUED | OUTPATIENT
Start: 2025-06-20 | End: 2025-06-20

## 2025-06-20 RX ORDER — ACETAMINOPHEN 500 MG
1000 TABLET ORAL EVERY 4 HOURS PRN
Status: DISCONTINUED | OUTPATIENT
Start: 2025-06-20 | End: 2025-06-21

## 2025-06-20 RX ORDER — NICOTINE POLACRILEX 4 MG
15 LOZENGE BUCCAL
Status: DISCONTINUED | OUTPATIENT
Start: 2025-06-20 | End: 2025-06-21

## 2025-06-20 RX ORDER — SODIUM PHOSPHATE, DIBASIC AND SODIUM PHOSPHATE, MONOBASIC 7; 19 G/230ML; G/230ML
1 ENEMA RECTAL ONCE AS NEEDED
Status: DISCONTINUED | OUTPATIENT
Start: 2025-06-20 | End: 2025-06-21

## 2025-06-20 RX ORDER — NICOTINE POLACRILEX 4 MG
30 LOZENGE BUCCAL
Status: DISCONTINUED | OUTPATIENT
Start: 2025-06-20 | End: 2025-06-21

## 2025-06-20 RX ORDER — MONTELUKAST SODIUM 10 MG/1
10 TABLET ORAL NIGHTLY
Status: DISCONTINUED | OUTPATIENT
Start: 2025-06-20 | End: 2025-06-21

## 2025-06-20 RX ORDER — POLYETHYLENE GLYCOL 3350 17 G/17G
17 POWDER, FOR SOLUTION ORAL DAILY PRN
Status: DISCONTINUED | OUTPATIENT
Start: 2025-06-20 | End: 2025-06-21

## 2025-06-20 RX ORDER — DEXTROSE MONOHYDRATE 25 G/50ML
50 INJECTION, SOLUTION INTRAVENOUS
Status: DISCONTINUED | OUTPATIENT
Start: 2025-06-20 | End: 2025-06-21

## 2025-06-20 RX ORDER — HYDROCODONE BITARTRATE AND ACETAMINOPHEN 10; 325 MG/1; MG/1
1 TABLET ORAL EVERY 4 HOURS PRN
Status: DISCONTINUED | OUTPATIENT
Start: 2025-06-20 | End: 2025-06-21

## 2025-06-20 RX ORDER — ONDANSETRON 2 MG/ML
4 INJECTION INTRAMUSCULAR; INTRAVENOUS ONCE
Status: COMPLETED | OUTPATIENT
Start: 2025-06-20 | End: 2025-06-20

## 2025-06-20 RX ORDER — ONDANSETRON 2 MG/ML
4 INJECTION INTRAMUSCULAR; INTRAVENOUS EVERY 6 HOURS PRN
Status: DISCONTINUED | OUTPATIENT
Start: 2025-06-20 | End: 2025-06-21

## 2025-06-20 RX ORDER — PRAVASTATIN SODIUM 20 MG
20 TABLET ORAL NIGHTLY
Status: DISCONTINUED | OUTPATIENT
Start: 2025-06-20 | End: 2025-06-21

## 2025-06-20 RX ORDER — METOCLOPRAMIDE HYDROCHLORIDE 5 MG/ML
5 INJECTION INTRAMUSCULAR; INTRAVENOUS EVERY 8 HOURS PRN
Status: DISCONTINUED | OUTPATIENT
Start: 2025-06-20 | End: 2025-06-21

## 2025-06-20 RX ORDER — BISACODYL 10 MG
10 SUPPOSITORY, RECTAL RECTAL
Status: DISCONTINUED | OUTPATIENT
Start: 2025-06-20 | End: 2025-06-21

## 2025-06-20 NOTE — ED INITIAL ASSESSMENT (HPI)
Pt states she was seen at [facility] on Sunday for asthma exacerbation.  Pt finished oral steroids and using inhalers.  Pt using nebs at home.  Pt states she has had severe coughing spells and now has bilateral lower back pain.  Pt with hx of back fx and sx.

## 2025-06-20 NOTE — ED QUICK NOTES
Rounding Completed    Plan of Care reviewed. Waiting for admission  Elimination needs assessed.  Provided warm blanket for comfort    Bed is locked and in lowest position. Call light within reach.

## 2025-06-20 NOTE — ED PROVIDER NOTES
Patient Seen in: [facility] Emergency Department        History  Chief Complaint   Patient presents with    Cough/URI    Back Pain     Stated Complaint: cough, back pain    Subjective:   74-year-old female, presents with back pain.  Patient history of laminectomy and kyphoplasty.  States her spinal surgeon is out of Dr. Tomi Johns.  States that she was seen a couple days ago for asthma and URI symptoms.  Was sent home on steroids.  States she started coughing yesterday and got severe low back pain.  No radicular symptoms.  No incontinence or retention.  No falls or blunt trauma.  No fevers.  Says she feels her chest is congested in her chest still from the coughing and then coughs hard now think she is getting pneumonia because she has too much pain when she coughs in her low back.                      Objective:     Past Medical History:    Anemia    Anxiety and depression    Anxiety state    Asthma (HCC)    Back problem    Calculus of kidney    Cataract    Colitis    ulcerative colitis    Colostomy present (HCC)    ileostomy in place    Deep vein thrombosis (HCC)    Depression    Diabetes (HCC)    Type 2    Disorder of thyroid    High blood pressure    High cholesterol    History of blood transfusion    after colectomy    HTN (hypertension)    Hyperlipidemia    Hypothyroidism    Kidney stones    Macular degeneration    Osteoarthritis    Osteoporosis    Pneumonia    Pulmonary embolism (HCC)    saddle- possibly due to immobility/fall    Pyelonephritis    Spinal stenosis    lumbar    Stage 3a chronic kidney disease (CKD) (HCC)    Visual impairment    readers              Past Surgical History:   Procedure Laterality Date    Colectomy      Colon surgery      ileostomy bag right side of abdomen    Ir ivc filter placement Right 07/30/2024    Lumbar spine surgery      kyphoplasty    Other surgical history      ureteric stent- due to kidney stones    Other surgical history  12/2023    saddle pulmonary embolism  removed    Parathyroidectomy                  Social History     Socioeconomic History    Marital status:    Tobacco Use    Smoking status: Never     Passive exposure: Never    Smokeless tobacco: Never   Vaping Use    Vaping status: Never Used   Substance and Sexual Activity    Alcohol use: Never    Drug use: Never     Social Drivers of Health     Food Insecurity: No Food Insecurity (8/22/2024)    Food Insecurity     Food Insecurity: Never true   Transportation Needs: No Transportation Needs (8/22/2024)    Transportation Needs     Lack of Transportation: No   Housing Stability: Low Risk  (8/22/2024)    Housing Stability     Housing Instability: No                                Physical Exam    ED Triage Vitals [06/20/25 1127]   /89   Pulse 89   Resp 20   Temp 97.4 °F (36.3 °C)   Temp src Temporal   SpO2 94 %   O2 Device None (Room air)       Current Vitals:   Vital Signs  BP: 131/77  Pulse: 64  Resp: 18  Temp: 97.4 °F (36.3 °C)  Temp src: Temporal  MAP (mmHg): 95    Oxygen Therapy  SpO2: 97 %  O2 Device: None (Room air)            Physical Exam  Vitals and nursing note reviewed.   HENT:      Head: Normocephalic.   Cardiovascular:      Rate and Rhythm: Normal rate.      Pulses: Normal pulses.      Heart sounds: Normal heart sounds.   Pulmonary:      Effort: Pulmonary effort is normal. No respiratory distress.   Abdominal:      Palpations: Abdomen is soft.      Tenderness: There is no abdominal tenderness.   Musculoskeletal:         General: Normal range of motion.      Cervical back: Neck supple.   Skin:     General: Skin is warm and dry.   Neurological:      General: No focal deficit present.      Mental Status: She is alert.      Sensory: No sensory deficit.      Motor: No weakness.      Coordination: Coordination normal.      Deep Tendon Reflexes: Reflexes normal.   Psychiatric:         Behavior: Behavior normal.       Tenderness along her incision in the lumbar spine.  Nothing more proximal than  that.  She had 2+ DTRs.  Strength 5 out of 5 equal in bilateral lower extremities.  Dermatomes intact.  No saddle anesthesia.        ED Course  Labs Reviewed   COMP METABOLIC PANEL (14)   CBC WITH DIFFERENTIAL WITH PLATELET   RAINBOW DRAW LAVENDER   RAINBOW DRAW LIGHT GREEN   RAINBOW DRAW BLUE                            MDM     XR LUMBAR SPINE (MIN 4 VIEWS) (CPT=72110)  Result Date: 6/20/2025  CONCLUSION:  1. T12 inferior endplate depression is of indeterminate age, correlate clinically. 2. L4 vertebroplasty changes as detailed above. 3. Straightening of the lumbar lordosis may be positional or due to muscle strain. 4. Please see additional details as above.   LOCATION:  [facility]   Dictated by (CST): Silvina Oakes MD on 6/20/2025 at 1:00 PM     Finalized by (CST): Silvina Oakes MD on 6/20/2025 at 1:03 PM       XR CHEST PA + LAT CHEST (FBC=25929)  Result Date: 6/20/2025  CONCLUSION:  1. Right hilar masslike opacity.  Recommend CT of the chest with contrast for further evaluation.   LOCATION:  [facility]   Dictated by (CST): Silvina Oakes MD on 6/20/2025 at 12:58 PM     Finalized by (CST): Silvina Oakes MD on 6/20/2025 at 1:00 PM         I independent interpreted the x-ray of the lumbar spine and noted the compression of L4 with previous kyphoplasty    Differential diagnosis includes, but not limited to, fracture, sprain, strain, sciatica, pneumonia, mass, viral syndrome    Daughter at bedside helpful to provide information on the history of presenting illness    External chart review demonstrates her recent visit for her URI symptoms, I cannot see her previous spinal visits with orthopedics      73-year-old female intractable back pain, URI symptoms.  X-ray with some Prerna consolidation in the right hilar region, we looked at her x-ray from a couple days ago, looks it was there were less prominent.  Could be mucous plugging, pneumonia, mass etc.  CT is pending.  Discussed with Dr. Jaramillo, neurosurgery, will see in consultation.  No MRI for  now she is neurologically intact.  Pain control, morphine with little improvement, Dilaudid now.  Admitted to Dr. Quinteros, awaiting bed assignment and CT which is pending          Admission disposition: 6/20/2025  1:38 PM           Medical Decision Making      Disposition and Plan     Clinical Impression:  1. Intractable low back pain    2. Abnormal chest x-ray         Disposition:  Admit  6/20/2025  1:38 pm    Follow-up:  No follow-up provider specified.        Medications Prescribed:  Current Discharge Medication List                Supplementary Documentation:         Hospital Problems       Present on Admission  Date Reviewed: 7/24/2024          ICD-10-CM Noted POA    Intractable pain R52 6/20/2025 Unknown

## 2025-06-20 NOTE — CONSULTS
[facility]   part of [facility]      Consult     Petty Valdovinos Patient Status:  Emergency    1950 MRN [id number]   Location [facility] EMERGENCY DEPARTMENT Attending Alli England, DO   Hosp Day # 0 PCP Blanka Grant MD     Referring Provider: ED  Reason for Consultation: LBP    Subjective:    Petty Valdovinos is a 74 year old female with new onset acute low back pain.  She has no numbness/tingling nor weakness in her LE.  She has no bowel/bladder habit changes.  She states she \"coughed\" and had sudden low back pain.  She denies any falls. She had a lumbar laminectomy by Dr. Krishna at [facility] in August of last year after sustaining a compression fracture s/p kyphoplasty at L4 a year prior.      History/Other:      Past Medical History:Past Medical History[1]     Past Surgical History: Past Surgical History[2]    Social History:  reports that she has never smoked. She has never been exposed to tobacco smoke. She has never used smokeless tobacco. She reports that she does not drink alcohol and does not use drugs.    Family History: Family History[3]    Allergies: Allergies[4]    Medications:  Medications Ordered Prior to Encounter[5]    Review of Systems:   Review of systems was completed.  Pertinent positives and negatives noted in the HPI.    Objective:     /76   Pulse 66   Temp 97.4 °F (36.3 °C) (Temporal)   Resp 13   Ht 63\"   Wt 170 lb (77.1 kg)   SpO2 90%   BMI 30.11 kg/m²     Neuro:    Motor: ALEJO with good strength    Sensory: intact to LT throughout    General: No acute distress.  Alert ,         Respiratory: No wheezes, no rhonchi, clear to auscultation bilaterally,    Cardiovascular: S1, S2.  Abdomen: Soft, nontender, nondistended.    Extremities: No edema, no cyanosis.    Results:    Labs:  Laboratory data reviewed.      Selected labs - last 24 hours:  Endo  Lytes  Renal   Glu 122  Na 135 Ca 9.7  BUN 25   POC Gluc  -  K 4.3 PO4 -  Cr 1.14   A1c -  Cl 101 Mg -  eGFR  51   TSH -  CO2 24.0         LFT  CBC  Other   AST 28  WBC 10.8  PTT - Procal -   ALT 33  Hb 13.6  INR - CRP -   APk 69  Hct 39.5  Trop - D dim -   T cary 0.6  .0  pBNP -  BNP -  - Ferritin  -   Prot 7.0    CK  - Lactate  -   Alb 4.7    LDL  - COVID  -       Imaging: Imaging data reviewed in Epic.    Assessment & Plan:    #1. LBP   -no surgical intervention   -pain management per hospitalist   -patient is without any neurologic issues, no further imaging of spine needed at this time   -patient to F/U with Dr. Krishna upon discharge   -will sign off   -call with questions    Plan of care discussed with patient and family at bedside    Ny Jaramillo DO  6/20/2025    Supplementary Documentation:                            [1]   Past Medical History:   Anemia    Anxiety and depression    Anxiety state    Asthma (HCC)    Back problem    Calculus of kidney    Cataract    Colitis    ulcerative colitis    Colostomy present (HCC)    ileostomy in place    Deep vein thrombosis (HCC)    Depression    Diabetes (HCC)    Type 2    Disorder of thyroid    High blood pressure    High cholesterol    History of blood transfusion    after colectomy    HTN (hypertension)    Hyperlipidemia    Hypothyroidism    Kidney stones    Macular degeneration    Osteoarthritis    Osteoporosis    Pneumonia    Pulmonary embolism (HCC)    saddle- possibly due to immobility/fall    Pyelonephritis    Spinal stenosis    lumbar    Stage 3a chronic kidney disease (CKD) (HCC)    Visual impairment    readers   [2]   Past Surgical History:  Procedure Laterality Date    Colectomy      Colon surgery      ileostomy bag right side of abdomen    Ir ivc filter placement Right 07/30/2024    Lumbar spine surgery      kyphoplasty    Other surgical history      ureteric stent- due to kidney stones    Other surgical history  12/2023    saddle pulmonary embolism removed    Parathyroidectomy     [3]   Family History  Problem Relation Age of Onset    Other (Other)  Father         MI    Cancer Mother         breast   [4]   Allergies  Allergen Reactions    Sulfa Antibiotics HIVES   [5]   No current facility-administered medications on file prior to encounter.     Current Outpatient Medications on File Prior to Encounter   Medication Sig Dispense Refill    amoxicillin 250 MG Oral Cap Take 1 capsule (250 mg total) by mouth daily. 90 days      albuterol (2.5 MG/3ML) 0.083% Inhalation Nebu Soln Take 3 mL (2.5 mg total) by nebulization every 4 (four) hours as needed for Wheezing or Shortness of Breath. 30 each 0    docusate sodium 100 MG Oral Cap Take 100 mg by mouth 2 (two) times daily. 30 capsule 0    Albuterol Sulfate 108 (90 Base) MCG/ACT Inhalation Aerosol Powder, Breath Activated Inhale 1-2 puffs into the lungs as needed (asthma).      Multiple Vitamins-Minerals (ICAPS AREDS 2 OR) Take 1 tablet by mouth in the morning and 1 tablet before bedtime.      Calcium Carbonate-Vit D-Min (CALCIUM 1200 OR) Take 1 tablet by mouth in the morning.      DULoxetine 30 MG Oral Cap DR Particles Take 1 capsule (30 mg total) by mouth in the morning.      apixaban 5 MG Oral Tab Take 1 tablet (5 mg total) by mouth 2 (two) times daily.      fenofibrate micronized 134 MG Oral Cap Take 1 capsule (134 mg total) by mouth in the morning.      gabapentin 400 MG Oral Cap Take 1 capsule (400 mg total) by mouth 3 (three) times daily. (Patient not taking: Reported on 6/15/2025)      ferrous sulfate 325 (65 FE) MG Oral Tab EC Take 1 tablet (325 mg total) by mouth 3 (three) times a week. Monday, Wednesday, Friday      levothyroxine 50 MCG Oral Tab Take 1 tablet (50 mcg total) by mouth before breakfast.      lisinopril 5 MG Oral Tab Take 1 tablet (5 mg total) by mouth in the morning.      metFORMIN 500 MG Oral Tab Take 1 tablet (500 mg total) by mouth 2 (two) times daily with meals. (Patient not taking: Reported on 6/15/2025)      montelukast 10 MG Oral Tab Take 1 tablet (10 mg total) by mouth in the morning.       morphINE 15 MG Oral Tab Take 1 tablet (15 mg total) by mouth in the morning and 1 tablet (15 mg total) before bedtime. (Patient not taking: Reported on 6/15/2025)      Multiple Vitamin (MULTIVITAMIN ADULT OR) Take 1 tablet by mouth in the morning.      simvastatin 10 MG Oral Tab Take 1 tablet (10 mg total) by mouth nightly.      fluticasone-umeclidin-vilant (TRELEGY ELLIPTA) 200-62.5-25 MCG/ACT Inhalation Aerosol Powder, Breath Activated Inhale 1 puff into the lungs in the morning.      cholecalciferol 50 MCG (2000 UT) Oral Tab Take 1 tablet (2,000 Units total) by mouth in the morning.

## 2025-06-20 NOTE — H&P
[facility]     Petty Valdovinos Patient Status:  Emergency    1950 MRN [id number]   Location [facility] EMERGENCY DEPARTMENT Attending Alli England, DO   Hosp Day # 0 PCP Blanka Grant MD     Chief Complaint:   Chief Complaint   Patient presents with    Cough/URI    Back Pain       Subjective:    History of Present Illness:     Petty Valdovinos is anxiety/depression, asthma, a 74 year old female with a past medical history of diabetes, hypertension, hyperlipidemia, hypothyroidism, history of pulmonary embolism, spinal stenosis and CKD.  She had lumbar laminectomy in August of last year after sustaining compression fracture and had kyphoplasty at [facility] prior.  She recently had URI symptoms and asthma.  She was sent home on steroids and breathing treatments.  She has continued to have significant coughing.  She did develop increasing lower back pain which prompted her to come to the emergency room today.  She denies any radicular symptoms.  In the emergency room x-ray showed T12 endplate depression of indeterminate age and straightening of the lumbar lordosis consistent with muscle strain.  Chest x-ray shows a right hilar masslike opacity.    History/Other:    Past Medical History:  Past Medical History[1]  Past Surgical History:   Past Surgical History[2]   Family History:   Family History[3]  Social History:    reports that she has never smoked. She has never been exposed to tobacco smoke. She has never used smokeless tobacco. She reports that she does not drink alcohol and does not use drugs.     Allergies: Allergies[4]    Medications:  Medications Ordered Prior to Encounter[5]    Review of Systems:   A comprehensive review of systems was completed.    Pertinent positives and negatives noted in the HPI.    Objective:   Physical Exam:    /78   Pulse 63   Temp 97.4 °F (36.3 °C) (Temporal)   Resp 12   Ht 5' 3\" (1.6 m)   Wt 170 lb (77.1 kg)   SpO2 92%    BMI 30.11 kg/m²   General: No acute distress, Alert  Respiratory: No rhonchi, no wheezes  Cardiovascular: S1, S2.   Abdomen: Soft, Non-tender, Non-distended, Positive bowel sounds  Neuro: No new focal deficits  Extremities: No edema      Results:    Labs:      Labs Last 24 Hours:  Recent Labs   Lab 06/15/25  1830 06/20/25  1156   WBC 7.9 10.8   HGB 13.9 13.6   MCV 90.1 87.2   .0 344.0       Recent Labs   Lab 06/15/25  1830 06/20/25  1157   * 122*   BUN 19 25*   CREATSERUM 1.05* 1.14*   CA 10.2 9.7   ALB  --  4.7   * 135*   K 4.1 4.3    101   CO2 23.0 24.0   ALKPHO  --  69   AST  --  28   ALT  --  33   BILT  --  0.6   TP  --  7.0       Estimated Glomerular Filtration Rate: 51 mL/min/1.73m2 (A) (result from lab).    No results for input(s): \"TROP\", \"TROPHS\", \"CK\" in the last 168 hours.    No results for input(s): \"PTP\", \"INR\" in the last 168 hours.    No results for input(s): \"TROP\", \"CK\" in the last 168 hours.      Imaging: Imaging data reviewed in Epic.    Assessment & Plan:      #LBP  Likely muscle strain from coughing  XR noted  Pain control, muscle relaxants    #Recent URTI  Completed ABX, can DC  start Anti-tussives    #Recent asthma flare  Cont. Inhalers  BD protocol  Completed course of steroids    #Hypercoagulable state with hx of PE  Cont. OAC    #HTN  Cont. Lisinopril    #DM2  insulin    #Hypothyroid  synthroid    All diagnosis' and recommendations discussed with patient and/or family in detail.      Plan of care discussed with ED physician      Sahil Quinteros MD    Supplementary Documentation:     The 21st Century Cures Act makes medical notes like these available to patients in the interest of transparency. Please be advised this is a medical document. Medical documents are intended to carry relevant information, facts as evident, and the clinical opinion of the practitioner. The medical note is intended as peer to peer communication and may appear blunt or direct. It is  written in medical language and may contain abbreviations or verbiage that are unfamiliar.                                         [1]   Past Medical History:   Anemia    Anxiety and depression    Anxiety state    Asthma (HCC)    Back problem    Calculus of kidney    Cataract    Colitis    ulcerative colitis    Colostomy present (HCC)    ileostomy in place    Deep vein thrombosis (HCC)    Depression    Diabetes (HCC)    Type 2    Disorder of thyroid    High blood pressure    High cholesterol    History of blood transfusion    after colectomy    HTN (hypertension)    Hyperlipidemia    Hypothyroidism    Kidney stones    Macular degeneration    Osteoarthritis    Osteoporosis    Pneumonia    Pulmonary embolism ([facility])    saddle- possibly due to immobility/fall    Pyelonephritis    Spinal stenosis    lumbar    Stage 3a chronic kidney disease (CKD) ([facility])    Visual impairment    readers   [2]   Past Surgical History:  Procedure Laterality Date    Colectomy      Colon surgery      ileostomy bag right side of abdomen    Ir ivc filter placement Right 07/30/2024    Lumbar spine surgery      kyphoplasty    Other surgical history      ureteric stent- due to kidney stones    Other surgical history  12/2023    saddle pulmonary embolism removed    Parathyroidectomy     [3]   Family History  Problem Relation Age of Onset    Other (Other) Father         MI    Cancer Mother         breast   [4]   Allergies  Allergen Reactions    Sulfa Antibiotics HIVES   [5]   No current facility-administered medications on file prior to encounter.     Current Outpatient Medications on File Prior to Encounter   Medication Sig Dispense Refill    amoxicillin 250 MG Oral Cap Take 1 capsule (250 mg total) by mouth daily. 90 days      albuterol (2.5 MG/3ML) 0.083% Inhalation Nebu Soln Take 3 mL (2.5 mg total) by nebulization every 4 (four) hours as needed for Wheezing or Shortness of Breath. 30 each 0    docusate sodium 100 MG Oral Cap Take 100 mg by mouth 2  (two) times daily. 30 capsule 0    Albuterol Sulfate 108 (90 Base) MCG/ACT Inhalation Aerosol Powder, Breath Activated Inhale 1-2 puffs into the lungs as needed (asthma).      Multiple Vitamins-Minerals (ICAPS AREDS 2 OR) Take 1 tablet by mouth in the morning and 1 tablet before bedtime.      Calcium Carbonate-Vit D-Min (CALCIUM 1200 OR) Take 1 tablet by mouth in the morning.      DULoxetine 30 MG Oral Cap DR Particles Take 1 capsule (30 mg total) by mouth in the morning.      apixaban 5 MG Oral Tab Take 1 tablet (5 mg total) by mouth 2 (two) times daily.      fenofibrate micronized 134 MG Oral Cap Take 1 capsule (134 mg total) by mouth in the morning.      gabapentin 400 MG Oral Cap Take 1 capsule (400 mg total) by mouth 3 (three) times daily. (Patient not taking: Reported on 6/15/2025)      ferrous sulfate 325 (65 FE) MG Oral Tab EC Take 1 tablet (325 mg total) by mouth 3 (three) times a week. Monday, Wednesday, Friday      levothyroxine 50 MCG Oral Tab Take 1 tablet (50 mcg total) by mouth before breakfast.      lisinopril 5 MG Oral Tab Take 1 tablet (5 mg total) by mouth in the morning.      metFORMIN 500 MG Oral Tab Take 1 tablet (500 mg total) by mouth 2 (two) times daily with meals. (Patient not taking: Reported on 6/15/2025)      montelukast 10 MG Oral Tab Take 1 tablet (10 mg total) by mouth in the morning.      morphINE 15 MG Oral Tab Take 1 tablet (15 mg total) by mouth in the morning and 1 tablet (15 mg total) before bedtime. (Patient not taking: Reported on 6/15/2025)      Multiple Vitamin (MULTIVITAMIN ADULT OR) Take 1 tablet by mouth in the morning.      simvastatin 10 MG Oral Tab Take 1 tablet (10 mg total) by mouth nightly.      fluticasone-umeclidin-vilant (TRELEGY ELLIPTA) 200-62.5-25 MCG/ACT Inhalation Aerosol Powder, Breath Activated Inhale 1 puff into the lungs in the morning.      cholecalciferol 50 MCG (2000 UT) Oral Tab Take 1 tablet (2,000 Units total) by mouth in the morning.

## 2025-06-20 NOTE — ED QUICK NOTES
Orders for admission, patient is aware of plan and ready to go upstairs. Any questions, please call ED RN Delmer at extension [phone].     Patient Covid vaccination status: Fully vaccinated     COVID Test Ordered in ED: None    COVID Suspicion at Admission: N/A    Running Infusions: Medication Infusions[1]     Mental Status/LOC at time of transport: A&Ox4    Other pertinent information:   CIWA score: N/A   NIH score:  N/A             [1]

## 2025-06-21 VITALS
OXYGEN SATURATION: 96 % | TEMPERATURE: 98 F | SYSTOLIC BLOOD PRESSURE: 120 MMHG | HEIGHT: 63 IN | RESPIRATION RATE: 20 BRPM | HEART RATE: 75 BPM | BODY MASS INDEX: 30.12 KG/M2 | DIASTOLIC BLOOD PRESSURE: 68 MMHG | WEIGHT: 170 LBS

## 2025-06-21 LAB
ANION GAP SERPL CALC-SCNC: 8 MMOL/L (ref 0–18)
BUN BLD-MCNC: 17 MG/DL (ref 9–23)
CALCIUM BLD-MCNC: 8.6 MG/DL (ref 8.7–10.6)
CHLORIDE SERPL-SCNC: 98 MMOL/L (ref 98–112)
CO2 SERPL-SCNC: 24 MMOL/L (ref 21–32)
CREAT BLD-MCNC: 0.85 MG/DL (ref 0.55–1.02)
EGFRCR SERPLBLD CKD-EPI 2021: 72 ML/MIN/1.73M2 (ref 60–?)
GLUCOSE BLD-MCNC: 105 MG/DL (ref 70–99)
GLUCOSE BLD-MCNC: 86 MG/DL (ref 70–99)
GLUCOSE BLD-MCNC: 89 MG/DL (ref 70–99)
MAGNESIUM SERPL-MCNC: 1.7 MG/DL (ref 1.6–2.6)
OSMOLALITY SERPL CALC.SUM OF ELEC: 271 MOSM/KG (ref 275–295)
POTASSIUM SERPL-SCNC: 4.2 MMOL/L (ref 3.5–5.1)
SODIUM SERPL-SCNC: 130 MMOL/L (ref 136–145)

## 2025-06-21 PROCEDURE — 99239 HOSP IP/OBS DSCHRG MGMT >30: CPT | Performed by: HOSPITALIST

## 2025-06-21 RX ORDER — PREDNISONE 20 MG/1
40 TABLET ORAL
Status: DISCONTINUED | OUTPATIENT
Start: 2025-06-21 | End: 2025-06-21

## 2025-06-21 RX ORDER — LISINOPRIL 5 MG/1
5 TABLET ORAL EVERY MORNING
Status: SHIPPED | COMMUNITY
Start: 2025-06-21

## 2025-06-21 RX ORDER — PREDNISONE 20 MG/1
TABLET ORAL
Qty: 11 TABLET | Refills: 0 | Status: SHIPPED | OUTPATIENT
Start: 2025-06-21 | End: 2025-06-29

## 2025-06-21 RX ORDER — MAGNESIUM OXIDE 400 MG/1
400 TABLET ORAL ONCE
Status: COMPLETED | OUTPATIENT
Start: 2025-06-21 | End: 2025-06-21

## 2025-06-21 RX ORDER — ONDANSETRON 4 MG/1
4 TABLET, ORALLY DISINTEGRATING ORAL EVERY 8 HOURS PRN
Qty: 15 TABLET | Refills: 0 | Status: SHIPPED | OUTPATIENT
Start: 2025-06-21

## 2025-06-21 RX ORDER — HYDROCODONE BITARTRATE AND ACETAMINOPHEN 5; 325 MG/1; MG/1
1 TABLET ORAL EVERY 4 HOURS PRN
Qty: 20 TABLET | Refills: 0 | Status: SHIPPED | OUTPATIENT
Start: 2025-06-21

## 2025-06-21 RX ORDER — BACLOFEN 10 MG/1
10 TABLET ORAL ONCE
Status: COMPLETED | OUTPATIENT
Start: 2025-06-21 | End: 2025-06-21

## 2025-06-21 RX ORDER — METHOCARBAMOL 500 MG/1
500 TABLET, FILM COATED ORAL 3 TIMES DAILY PRN
Qty: 20 TABLET | Refills: 0 | Status: SHIPPED | OUTPATIENT
Start: 2025-06-21

## 2025-06-21 NOTE — DISCHARGE INSTRUCTIONS
Norco for pain  Robaxin for muscle spasms  Do not take robaxin and noroc at the same time  Monitor for signs of lethargy/sleepiness  Do not drive or operate machinery/drive while taking narcotics.  Take OTC laxative while on narcotics to make sure you have a bowel movement at least every other day.

## 2025-06-21 NOTE — PLAN OF CARE
Patient is Aox4, VSS on 1L-weaning as able, moderate amount of pain but improvement with PRN oral medication, main complaint of dyspnea, O2 saturation normal, bilateral rhonchi and crackling to bilateral lungs, some inspiratory wheezing noted mainly to R lung, non productive frequent cough, PRN nebs, back pain with some improvement, call light and belongings within reach.

## 2025-06-21 NOTE — PLAN OF CARE
NURSING ADMISSION NOTE      Patient admitted via Cart  Oriented to room.  Safety precautions initiated.  Bed in low position.  Call light in reach.  Alert and Oriented x4. On 2L via NC. VSS. Moderate Pain controlled by scheduled medications, see MAR for actions. Denies N/T. Voiding freely, Colostomy to RLQ with output. Tolerating diet. Denies N/V. Call light within reach at this time.  2-Person skin check done with ROSELIA Isabel, PCT. Skin WNL    Plan: PRN Nebs, pain management

## 2025-06-21 NOTE — PROGRESS NOTES
Patient seen and examined.     Gen: NAD  Resp: rhonchi w/ mild wheezes  CVS: s1s2  Abd: soft, +bowel sounds  Neck: trachea is midline    A/P:    #LBP  Likely muscle strain from coughing  XR noted  Pain control, muscle relaxants     #Recent URI  Completed ABX, can DC  start Anti-tussives     #Acute  asthma flare  Cont. Inhalers  BD protocol  Restarted course of steroids     #Hypercoagulable state with hx of PE  Cont. OAC     #HTN  Cont. Lisinopril     #DM2  insulin     #Hypothyroid  synthroid    Dispo: as above.  Resumed steroids PO as having some rhonchi, intermittent wheezes.  Likely DC later today     Total minutes spent on discharge plannin      Sahil Quinteros MD

## 2025-06-23 NOTE — PROGRESS NOTES
[facility]     Petty Valdovinos Patient Status:  Observation    1950 MRN [id number]   Location [facility] 3SW-A Attending No att. providers found   Hosp Day # 0 PCP Blanka Grant MD     Date of Admission:  2025  Date of Discharge:   2025    Discharge Disposition: Home or Self Care    Discharge Diagnosis:    #LBP  muscle strain from coughing  XR noted  Pain control, muscle relaxants     #Recent URI  Completed ABX, can DC  start Anti-tussives     #Acute  asthma flare  Cont. Inhalers  BD protocol  PO pred     #Hypercoagulable state with hx of PE  Cont. OAC     #HTN  Cont. Lisinopril     #DM2  insulin     #Hypothyroid  synthroid    History of Present Illness:    Petty Valdovinos is anxiety/depression, asthma, a 74 year old female with a past medical history of diabetes, hypertension, hyperlipidemia, hypothyroidism, history of pulmonary embolism, spinal stenosis and CKD.  She had lumbar laminectomy in August of last year after sustaining compression fracture and had kyphoplasty at [facility] prior.  She recently had URI symptoms and asthma.  She was sent home on steroids and breathing treatments.  She has continued to have significant coughing.  She did develop increasing lower back pain which prompted her to come to the emergency room today.  She denies any radicular symptoms.  In the emergency room x-ray showed T12 endplate depression of indeterminate age and straightening of the lumbar lordosis consistent with muscle strain.  Chest x-ray shows a right hilar masslike opacity.       Brief Synopsis:    The patient was admitted due to back pain secondary to muscle strain.  She started on pain medications with improvement.  She had a recent URI and completed a course of antibiotics.  She was restarted on steroids for her recent acute asthma flare given her persistent bronchospasms.  She was eventually stable for discharge home on pain medications and she will complete a  course of steroids as well.    All diagnosis' and recommendations discussed with patient and/or family in detail.      Lace+ Score: 67  59-90 High Risk  29-58 Medium Risk  0-28   Low Risk       TCM Follow-Up Recommendation:  LACE > 58: High Risk of readmission after discharge from the hospital.    Consultants:  neurosurgery    Discharge Medication List:     Discharge Medications        START taking these medications        Instructions Prescription details   HYDROcodone-acetaminophen 5-325 MG Tabs  Commonly known as: Norco      Take 1 tablet by mouth every 4 (four) hours as needed.   Quantity: 20 tablet  Refills: 0     methocarbamol 500 MG Tabs  Commonly known as: Robaxin      Take 1 tablet (500 mg total) by mouth 3 (three) times daily as needed (muscle spasms).   Quantity: 20 tablet  Refills: 0     ondansetron 4 MG Tbdp  Commonly known as: Zofran-ODT      Take 1 tablet (4 mg total) by mouth every 8 (eight) hours as needed for Nausea.   Quantity: 15 tablet  Refills: 0     predniSONE 20 MG Tabs  Commonly known as: Deltasone  Start taking on: June 21, 2025      Take 2 tablets (40 mg total) by mouth daily with breakfast for 3 days, THEN 1 tablet (20 mg total) daily with breakfast for 5 days.   Stop taking on: June 29, 2025  Quantity: 11 tablet  Refills: 0            CONTINUE taking these medications        Instructions Prescription details   Albuterol Sulfate 108 (90 Base) MCG/ACT Aepb      Inhale 1-2 puffs into the lungs as needed (asthma).   Refills: 0     albuterol (2.5 MG/3ML) 0.083% Nebu  Commonly known as: Ventolin      Take 3 mL (2.5 mg total) by nebulization every 4 (four) hours as needed for Wheezing or Shortness of Breath.   Stop taking on: July 15, 2025  Quantity: 30 each  Refills: 0     apixaban 5 MG Tabs  Commonly known as: Eliquis      Take 1 tablet (5 mg total) by mouth 2 (two) times daily.   Refills: 0     AZO D-MANNOSE OR      Take 1 tablet by mouth in the morning.   Refills: 0     CALCIUM 1200 OR       Take 1 tablet by mouth in the morning.   Refills: 0     cholecalciferol 50 MCG (2000 UT) Tabs  Commonly known as: Vitamin D3      Take 1 tablet (2,000 Units total) by mouth in the morning.   Refills: 0     docusate sodium 100 MG Caps  Commonly known as: COLACE      Take 100 mg by mouth 2 (two) times daily.   Quantity: 30 capsule  Refills: 0     DULoxetine 30 MG Cpep  Commonly known as: Cymbalta      Take 1 capsule (30 mg total) by mouth in the morning.   Refills: 0     fenofibrate micronized 134 MG Caps  Commonly known as: Lofibra      Take 1 capsule (134 mg total) by mouth in the morning.   Refills: 0     ferrous sulfate 325 (65 FE) MG Tbec      Take 1 tablet (325 mg total) by mouth 3 (three) times a week. Monday, Wednesday, Friday   Refills: 0     ICAPS AREDS 2 OR      Take 1 tablet by mouth in the morning and 1 tablet before bedtime.   Refills: 0     levothyroxine 50 MCG Tabs  Commonly known as: Synthroid      Take 1 tablet (50 mcg total) by mouth before breakfast.   Refills: 0     lisinopril 5 MG Tabs  Commonly known as: Prinivil; Zestril      Take 1 tablet (5 mg total) by mouth every morning.   Refills: 0     montelukast 10 MG Tabs  Commonly known as: Singulair      Take 1 tablet (10 mg total) by mouth nightly.   Refills: 0     MULTIVITAMIN ADULT OR      Take 1 tablet by mouth in the morning.   Refills: 0     simvastatin 10 MG Tabs  Commonly known as: Zocor      Take 1 tablet (10 mg total) by mouth nightly.   Refills: 0     Trelegy Ellipta 200-62.5-25 MCG/ACT Aepb  Generic drug: fluticasone-umeclidin-vilant      Inhale 1 puff into the lungs in the morning.   Refills: 0            STOP taking these medications      amoxicillin 250 MG Caps  Commonly known as: Amoxil                  Where to Get Your Medications        These medications were sent to [id number] - [place], [place] - [address]  [facility] [address], [phone], [phone] [id number]  [facility] [address]       Phone: [phone]   HYDROcodone-acetaminophen 5-325 MG Tabs  methocarbamol 500 MG Tabs  ondansetron 4 MG Tbdp  predniSONE 20 MG Tabs         [place]    Follow-up appointment:   No follow-up provider specified.    Vital signs:       Physical Exam:    General: No acute distress   Lungs: rhonchi with mild wheezes  Cardiovascular: S1, S2  Abdomen: Soft      -----------------------------------------------------------------------------------------------  PATIENT DISCHARGE INSTRUCTIONS: See electronic chart    Sahil Quinteros MD    Total minutes spent on discharge plannin      The  [place] Cures Act makes medical notes like these available to patients in the interest of transparency. Please be advised this is a medical document. Medical documents are intended to carry relevant information, facts as evident, and the clinical opinion of the practitioner. The medical note is intended as peer to peer communication and may appear blunt or direct. It is written in medical language and may contain abbreviations or verbiage that are unfamiliar.

## (undated) DEVICE — KIT EVAC 400CC DIA1/8IN H PAT 12.5IN 3 SPR

## (undated) DEVICE — PENCIL ES BTTN SWCH W/ TIP HOLSTER E-Z CLN

## (undated) DEVICE — SPK10329 JACKSON KIT: Brand: SPK10329 JACKSON KIT

## (undated) DEVICE — SPONGE: SPECIALTY PEANUT XR 100/CS: Brand: MEDICAL ACTION INDUSTRIES

## (undated) DEVICE — GLOVE SUR 6.5 SENSICARE PI MIC PIP CRM PWD F

## (undated) DEVICE — 3M™ STERI-DRAPE™ U-DRAPE 1015: Brand: STERI-DRAPE™

## (undated) DEVICE — WRAP COOLING BACK W/NO PILLOW

## (undated) DEVICE — SUT COAT VCRL+ 0 27IN UR-6 ABSRB VLT ANTIBACT

## (undated) DEVICE — GLOVE SUR 7 SENSICARE PI MIC PIP CRM PWD F

## (undated) DEVICE — NON-ADHERENT DRESSING: Brand: TELFA

## (undated) DEVICE — 3M™ TEGADERM™ TRANSPARENT FILM DRESSING FRAME STYLE, 1626W, 4 IN X 4-3/4 IN (10 CM X 12 CM), 50/CT 4CT/CASE: Brand: 3M™ TEGADERM™

## (undated) DEVICE — GOWN,SIRUS,FAB REINF,RAGLAN,L,STERILE: Brand: MEDLINE

## (undated) DEVICE — GLOVE SUR 8.5 SENSICARE PI MIC PIP CRM PWD F

## (undated) DEVICE — SUT MCRYL 4-0 18IN PS-2 ABSRB UD 19MM 3/8 CIR

## (undated) DEVICE — SPONGE 4X4 10PK

## (undated) DEVICE — GLOVE SUR 7 SENSICARE PI PIP GRN PWD F

## (undated) DEVICE — KIT HEMSTAT MTRX 8ML PORCINE GEL HUM THROM

## (undated) DEVICE — PACK CDS LAMINECTOMY

## (undated) DEVICE — INTENDED USE FOR SURGICAL MARKING ON INTACT SKIN, ALSO PROVIDES A PERMANENT METHOD OF IDENTIFYING OBJECTS IN THE OPERATING ROOM: Brand: WRITESITE® PLUS MINI PREP RESISTANT MARKER

## (undated) DEVICE — TUBING MEGADYNE SPECULUM

## (undated) DEVICE — ANTIBACTERIAL UNDYED BRAIDED (POLYGLACTIN 910), SYNTHETIC ABSORBABLE SUTURE: Brand: COATED VICRYL

## (undated) DEVICE — 3.0MM PRECISION NEURO (MATCH HEAD)

## (undated) DEVICE — C-ARMOR C-ARM EQUIPMENT COVERS CLEAR STERILE UNIVERSAL FIT 12 PER CASE: Brand: C-ARMOR

## (undated) DEVICE — SURGIFOAM SPNG SZ 100

## (undated) DEVICE — GOWN,SIRUS,FAB REINF,RAGLAN,XL,STERILE: Brand: MEDLINE

## (undated) DEVICE — E-Z CLEAN, NON-STICK, PTFE COATED, ELECTROSURGICAL BLADE ELECTRODE, MODIFIED EXTENDED INSULATION, 6.5 INCH (16.5 CM): Brand: MEGADYNE

## (undated) DEVICE — SUT MCRYL 3-0 18IN PS-2 ABSRB UD 19MM 3/8 CIR

## (undated) DEVICE — GLOVE SUR 6.5 SENSICARE PI PIP GRN PWD F